# Patient Record
Sex: FEMALE | Race: WHITE | NOT HISPANIC OR LATINO | URBAN - METROPOLITAN AREA
[De-identification: names, ages, dates, MRNs, and addresses within clinical notes are randomized per-mention and may not be internally consistent; named-entity substitution may affect disease eponyms.]

---

## 2017-01-01 ENCOUNTER — APPOINTMENT (INPATIENT)
Dept: NEUROLOGY | Facility: AMBULATORY SURGERY CENTER | Age: 67
DRG: 207 | End: 2017-01-01
Payer: MEDICARE

## 2017-01-01 ENCOUNTER — APPOINTMENT (INPATIENT)
Dept: RADIOLOGY | Facility: HOSPITAL | Age: 67
DRG: 207 | End: 2017-01-01
Payer: MEDICARE

## 2017-01-01 ENCOUNTER — GENERIC CONVERSION - ENCOUNTER (OUTPATIENT)
Dept: OTHER | Facility: OTHER | Age: 67
End: 2017-01-01

## 2017-01-01 ENCOUNTER — APPOINTMENT (INPATIENT)
Dept: NON INVASIVE DIAGNOSTICS | Facility: HOSPITAL | Age: 67
DRG: 207 | End: 2017-01-01
Payer: MEDICARE

## 2017-01-01 ENCOUNTER — LAB REQUISITION (OUTPATIENT)
Dept: LAB | Facility: HOSPITAL | Age: 67
End: 2017-01-01
Payer: MEDICARE

## 2017-01-01 ENCOUNTER — HOSPITAL ENCOUNTER (INPATIENT)
Facility: HOSPITAL | Age: 67
LOS: 5 days | DRG: 207 | End: 2018-01-03
Attending: EMERGENCY MEDICINE | Admitting: EMERGENCY MEDICINE
Payer: MEDICARE

## 2017-01-01 ENCOUNTER — APPOINTMENT (EMERGENCY)
Dept: RADIOLOGY | Facility: HOSPITAL | Age: 67
DRG: 207 | End: 2017-01-01
Payer: MEDICARE

## 2017-01-01 ENCOUNTER — ALLSCRIPTS OFFICE VISIT (OUTPATIENT)
Dept: OTHER | Facility: OTHER | Age: 67
End: 2017-01-01

## 2017-01-01 DIAGNOSIS — R04.2 HEMOPTYSIS: ICD-10-CM

## 2017-01-01 DIAGNOSIS — G93.40 ACUTE ENCEPHALOPATHY: ICD-10-CM

## 2017-01-01 DIAGNOSIS — Z12.31 ENCOUNTER FOR SCREENING MAMMOGRAM FOR MALIGNANT NEOPLASM OF BREAST: ICD-10-CM

## 2017-01-01 DIAGNOSIS — Z01.419 ENCOUNTER FOR GYNECOLOGICAL EXAMINATION WITHOUT ABNORMAL FINDING: ICD-10-CM

## 2017-01-01 DIAGNOSIS — I46.9 CARDIAC ARREST (HCC): Primary | ICD-10-CM

## 2017-01-01 LAB
ABO GROUP BLD: NORMAL
ADDITIONAL SETTING: 0
ALBUMIN SERPL BCP-MCNC: 2.4 G/DL (ref 3.5–5)
ALBUMIN SERPL BCP-MCNC: 2.4 G/DL (ref 3.5–5)
ALBUMIN SERPL BCP-MCNC: 2.6 G/DL (ref 3.5–5)
ALBUMIN SERPL BCP-MCNC: 3 G/DL (ref 3.5–5)
ALP SERPL-CCNC: 71 U/L (ref 46–116)
ALP SERPL-CCNC: 72 U/L (ref 46–116)
ALP SERPL-CCNC: 83 U/L (ref 46–116)
ALP SERPL-CCNC: 99 U/L (ref 46–116)
ALT SERPL W P-5'-P-CCNC: 236 U/L (ref 12–78)
ALT SERPL W P-5'-P-CCNC: 300 U/L (ref 12–78)
ALT SERPL W P-5'-P-CCNC: 325 U/L (ref 12–78)
ALT SERPL W P-5'-P-CCNC: 372 U/L (ref 12–78)
ANCILLARY VALUES: ABNORMAL
ANION GAP BLD CALC-SCNC: 21 MMOL/L (ref 4–13)
ANION GAP SERPL CALCULATED.3IONS-SCNC: 10 MMOL/L (ref 4–13)
ANION GAP SERPL CALCULATED.3IONS-SCNC: 11 MMOL/L (ref 4–13)
ANION GAP SERPL CALCULATED.3IONS-SCNC: 12 MMOL/L (ref 4–13)
ANION GAP SERPL CALCULATED.3IONS-SCNC: 13 MMOL/L (ref 4–13)
ANION GAP SERPL CALCULATED.3IONS-SCNC: 14 MMOL/L (ref 4–13)
ANION GAP SERPL CALCULATED.3IONS-SCNC: 17 MMOL/L (ref 4–13)
ANION GAP SERPL CALCULATED.3IONS-SCNC: 8 MMOL/L (ref 4–13)
ANION GAP SERPL CALCULATED.3IONS-SCNC: 9 MMOL/L (ref 4–13)
APTT PPP: 101 SECONDS (ref 23–35)
APTT PPP: 28 SECONDS (ref 23–35)
APTT PPP: 32 SECONDS (ref 23–35)
APTT PPP: 41 SECONDS (ref 23–35)
APTT PPP: 49 SECONDS (ref 23–35)
APTT PPP: 63 SECONDS (ref 23–35)
APTT PPP: 93 SECONDS (ref 23–35)
APTT PPP: 97 SECONDS (ref 23–35)
ARTERIAL PATENCY WRIST A: ABNORMAL
ARTERIAL PATENCY WRIST A: NO
ARTERIAL PATENCY WRIST A: NO
ARTERIAL PATENCY WRIST A: YES
AST SERPL W P-5'-P-CCNC: 329 U/L (ref 5–45)
AST SERPL W P-5'-P-CCNC: 357 U/L (ref 5–45)
AST SERPL W P-5'-P-CCNC: 523 U/L (ref 5–45)
AST SERPL W P-5'-P-CCNC: 791 U/L (ref 5–45)
ATRIAL RATE: 64 BPM
ATRIAL RATE: 72 BPM
ATRIAL RATE: 73 BPM
ATRIAL RATE: 99 BPM
BASE EXCESS BLDA CALC-SCNC: -13 MMOL/L (ref -2–3)
BASE EXCESS BLDA CALC-SCNC: -13 MMOL/L (ref -2–3)
BASE EXCESS BLDA CALC-SCNC: -2.4 MMOL/L
BASE EXCESS BLDA CALC-SCNC: -2.9 MMOL/L
BASE EXCESS BLDA CALC-SCNC: -3.8 MMOL/L
BASE EXCESS BLDA CALC-SCNC: -5 MMOL/L
BASE EXCESS BLDA CALC-SCNC: -9.5 MMOL/L
BASE EXCESS BLDA CALC-SCNC: 0.6 MMOL/L
BASE EXCESS BLDA CALC-SCNC: 0.7 MMOL/L
BASE EXCESS BLDA CALC-SCNC: 0.9 MMOL/L
BASE EXCESS BLDA CALC-SCNC: 2 MMOL/L
BASE EXCESS BLDA CALC-SCNC: 2.8 MMOL/L
BASOPHILS # BLD AUTO: 0.01 THOUSANDS/ΜL (ref 0–0.1)
BASOPHILS # BLD AUTO: 0.01 THOUSANDS/ΜL (ref 0–0.1)
BASOPHILS # BLD AUTO: 0.03 THOUSANDS/ΜL (ref 0–0.1)
BASOPHILS # BLD MANUAL: 0 THOUSAND/UL (ref 0–0.1)
BASOPHILS # BLD MANUAL: 0.11 THOUSAND/UL (ref 0–0.1)
BASOPHILS NFR BLD AUTO: 0 % (ref 0–1)
BASOPHILS NFR MAR MANUAL: 0 % (ref 0–1)
BASOPHILS NFR MAR MANUAL: 2 % (ref 0–1)
BILIRUB SERPL-MCNC: 0.29 MG/DL (ref 0.2–1)
BILIRUB SERPL-MCNC: 0.41 MG/DL (ref 0.2–1)
BILIRUB SERPL-MCNC: 0.61 MG/DL (ref 0.2–1)
BILIRUB SERPL-MCNC: 0.66 MG/DL (ref 0.2–1)
BLD GP AB SCN SERPL QL: NEGATIVE
BODY TEMPERATURE: 36.6 DEGREES FEHRENHEIT
BODY TEMPERATURE: 37 DEGREES FEHRENHEIT
BODY TEMPERATURE: 37.1 DEGREES FEHRENHEIT
BODY TEMPERATURE: 96.1 DEGREES FEHRENHEIT
BODY TEMPERATURE: 96.4 DEGREES FEHRENHEIT
BODY TEMPERATURE: 96.8 DEGREES FEHRENHEIT
BODY TEMPERATURE: 97 DEGREES FEHRENHEIT
BODY TEMPERATURE: 98.3 DEGREES FEHRENHEIT
BODY TEMPERATURE: 98.8 DEGREES FEHRENHEIT
BUN BLD-MCNC: 26 MG/DL (ref 5–25)
BUN SERPL-MCNC: 21 MG/DL (ref 5–25)
BUN SERPL-MCNC: 23 MG/DL (ref 5–25)
BUN SERPL-MCNC: 26 MG/DL (ref 5–25)
BUN SERPL-MCNC: 27 MG/DL (ref 5–25)
BUN SERPL-MCNC: 28 MG/DL (ref 5–25)
BUN SERPL-MCNC: 29 MG/DL (ref 5–25)
BUN SERPL-MCNC: 29 MG/DL (ref 5–25)
BUN SERPL-MCNC: 31 MG/DL (ref 5–25)
BURR CELLS BLD QL SMEAR: PRESENT
CA-I BLD-SCNC: 1.1 MMOL/L (ref 1.12–1.32)
CA-I BLD-SCNC: 1.15 MMOL/L (ref 1.12–1.32)
CA-I BLD-SCNC: 1.16 MMOL/L (ref 1.12–1.32)
CA-I BLD-SCNC: 1.17 MMOL/L (ref 1.12–1.32)
CA-I BLD-SCNC: 1.24 MMOL/L (ref 1.12–1.32)
CA-I BLD-SCNC: 1.48 MMOL/L (ref 1.12–1.32)
CALCIUM SERPL-MCNC: 8 MG/DL (ref 8.3–10.1)
CALCIUM SERPL-MCNC: 8.1 MG/DL (ref 8.3–10.1)
CALCIUM SERPL-MCNC: 8.2 MG/DL (ref 8.3–10.1)
CALCIUM SERPL-MCNC: 8.3 MG/DL (ref 8.3–10.1)
CALCIUM SERPL-MCNC: 8.7 MG/DL (ref 8.3–10.1)
CALCIUM SERPL-MCNC: 8.7 MG/DL (ref 8.3–10.1)
CALCIUM SERPL-MCNC: 8.9 MG/DL (ref 8.3–10.1)
CALCIUM SERPL-MCNC: 9.7 MG/DL (ref 8.3–10.1)
CHLORIDE BLD-SCNC: 103 MMOL/L (ref 100–108)
CHLORIDE SERPL-SCNC: 103 MMOL/L (ref 100–108)
CHLORIDE SERPL-SCNC: 106 MMOL/L (ref 100–108)
CHLORIDE SERPL-SCNC: 108 MMOL/L (ref 100–108)
CHLORIDE SERPL-SCNC: 110 MMOL/L (ref 100–108)
CHLORIDE SERPL-SCNC: 110 MMOL/L (ref 100–108)
CHLORIDE SERPL-SCNC: 111 MMOL/L (ref 100–108)
CO2 SERPL-SCNC: 20 MMOL/L (ref 21–32)
CO2 SERPL-SCNC: 21 MMOL/L (ref 21–32)
CO2 SERPL-SCNC: 22 MMOL/L (ref 21–32)
CO2 SERPL-SCNC: 23 MMOL/L (ref 21–32)
CO2 SERPL-SCNC: 24 MMOL/L (ref 21–32)
CO2 SERPL-SCNC: 25 MMOL/L (ref 21–32)
CO2 SERPL-SCNC: 25 MMOL/L (ref 21–32)
CO2 SERPL-SCNC: 27 MMOL/L (ref 21–32)
CREAT BLD-MCNC: 1 MG/DL (ref 0.6–1.3)
CREAT SERPL-MCNC: 1.14 MG/DL (ref 0.6–1.3)
CREAT SERPL-MCNC: 1.15 MG/DL (ref 0.6–1.3)
CREAT SERPL-MCNC: 1.15 MG/DL (ref 0.6–1.3)
CREAT SERPL-MCNC: 1.2 MG/DL (ref 0.6–1.3)
CREAT SERPL-MCNC: 1.2 MG/DL (ref 0.6–1.3)
CREAT SERPL-MCNC: 1.23 MG/DL (ref 0.6–1.3)
CREAT SERPL-MCNC: 1.27 MG/DL (ref 0.6–1.3)
CREAT SERPL-MCNC: 1.28 MG/DL (ref 0.6–1.3)
DS:DELIVERY SYSTEM: AC
EOSINOPHIL # BLD AUTO: 0.01 THOUSAND/ΜL (ref 0–0.61)
EOSINOPHIL # BLD AUTO: 0.02 THOUSAND/ΜL (ref 0–0.61)
EOSINOPHIL # BLD AUTO: 0.06 THOUSAND/ΜL (ref 0–0.61)
EOSINOPHIL # BLD MANUAL: 0 THOUSAND/UL (ref 0–0.4)
EOSINOPHIL # BLD MANUAL: 0.16 THOUSAND/UL (ref 0–0.4)
EOSINOPHIL NFR BLD AUTO: 0 % (ref 0–6)
EOSINOPHIL NFR BLD MANUAL: 0 % (ref 0–6)
EOSINOPHIL NFR BLD MANUAL: 3 % (ref 0–6)
ERYTHROCYTE [DISTWIDTH] IN BLOOD BY AUTOMATED COUNT: 13.6 % (ref 11.6–15.1)
ERYTHROCYTE [DISTWIDTH] IN BLOOD BY AUTOMATED COUNT: 13.8 % (ref 11.6–15.1)
ERYTHROCYTE [DISTWIDTH] IN BLOOD BY AUTOMATED COUNT: 13.8 % (ref 11.6–15.1)
ERYTHROCYTE [DISTWIDTH] IN BLOOD BY AUTOMATED COUNT: 13.9 % (ref 11.6–15.1)
ERYTHROCYTE [DISTWIDTH] IN BLOOD BY AUTOMATED COUNT: 13.9 % (ref 11.6–15.1)
ERYTHROCYTE [DISTWIDTH] IN BLOOD BY AUTOMATED COUNT: 14.1 % (ref 11.6–15.1)
FIO2 GAS DIL.REBREATH: 100 L
FLUAV AG SPEC QL: NORMAL
FLUBV AG SPEC QL: NORMAL
GFR SERPL CREATININE-BSD FRML MDRD: 44 ML/MIN/1.73SQ M
GFR SERPL CREATININE-BSD FRML MDRD: 44 ML/MIN/1.73SQ M
GFR SERPL CREATININE-BSD FRML MDRD: 46 ML/MIN/1.73SQ M
GFR SERPL CREATININE-BSD FRML MDRD: 47 ML/MIN/1.73SQ M
GFR SERPL CREATININE-BSD FRML MDRD: 47 ML/MIN/1.73SQ M
GFR SERPL CREATININE-BSD FRML MDRD: 50 ML/MIN/1.73SQ M
GFR SERPL CREATININE-BSD FRML MDRD: 59 ML/MIN/1.73SQ M
GIANT PLATELETS BLD QL SMEAR: PRESENT
GLUCOSE SERPL-MCNC: 105 MG/DL (ref 65–140)
GLUCOSE SERPL-MCNC: 108 MG/DL (ref 65–140)
GLUCOSE SERPL-MCNC: 114 MG/DL (ref 65–140)
GLUCOSE SERPL-MCNC: 126 MG/DL (ref 65–140)
GLUCOSE SERPL-MCNC: 148 MG/DL (ref 65–140)
GLUCOSE SERPL-MCNC: 155 MG/DL (ref 65–140)
GLUCOSE SERPL-MCNC: 159 MG/DL (ref 65–140)
GLUCOSE SERPL-MCNC: 160 MG/DL (ref 65–140)
GLUCOSE SERPL-MCNC: 168 MG/DL (ref 65–140)
GLUCOSE SERPL-MCNC: 173 MG/DL (ref 65–140)
GLUCOSE SERPL-MCNC: 174 MG/DL (ref 65–140)
GLUCOSE SERPL-MCNC: 183 MG/DL (ref 65–140)
GLUCOSE SERPL-MCNC: 196 MG/DL (ref 65–140)
GLUCOSE SERPL-MCNC: 199 MG/DL (ref 65–140)
GLUCOSE SERPL-MCNC: 234 MG/DL (ref 65–140)
GLUCOSE SERPL-MCNC: 282 MG/DL (ref 65–140)
GLUCOSE SERPL-MCNC: 299 MG/DL (ref 65–140)
GLUCOSE SERPL-MCNC: 301 MG/DL (ref 65–140)
GLUCOSE SERPL-MCNC: 312 MG/DL (ref 65–140)
HCO3 BLDA-SCNC: 16.3 MMOL/L (ref 22–28)
HCO3 BLDA-SCNC: 18.2 MMOL/L (ref 22–28)
HCO3 BLDA-SCNC: 18.3 MMOL/L (ref 22–28)
HCO3 BLDA-SCNC: 19.1 MMOL/L (ref 24–30)
HCO3 BLDA-SCNC: 19.8 MMOL/L (ref 22–28)
HCO3 BLDA-SCNC: 21.5 MMOL/L (ref 22–28)
HCO3 BLDA-SCNC: 21.6 MMOL/L (ref 22–28)
HCO3 BLDA-SCNC: 23.1 MMOL/L (ref 22–28)
HCO3 BLDA-SCNC: 23.6 MMOL/L (ref 22–28)
HCO3 BLDA-SCNC: 23.9 MMOL/L (ref 22–28)
HCO3 BLDA-SCNC: 24.9 MMOL/L (ref 22–28)
HCO3 BLDA-SCNC: 25.8 MMOL/L (ref 22–28)
HCT VFR BLD AUTO: 32.6 % (ref 34.8–46.1)
HCT VFR BLD AUTO: 35.6 % (ref 34.8–46.1)
HCT VFR BLD AUTO: 35.9 % (ref 34.8–46.1)
HCT VFR BLD AUTO: 37.2 % (ref 34.8–46.1)
HCT VFR BLD AUTO: 39.1 % (ref 34.8–46.1)
HCT VFR BLD AUTO: 41.1 % (ref 34.8–46.1)
HCT VFR BLD CALC: 30 % (ref 34.8–46.1)
HCT VFR BLD CALC: 39 % (ref 34.8–46.1)
HCT VFR BLD CALC: 40 % (ref 34.8–46.1)
HGB BLD-MCNC: 11.1 G/DL (ref 11.5–15.4)
HGB BLD-MCNC: 12.1 G/DL (ref 11.5–15.4)
HGB BLD-MCNC: 12.4 G/DL (ref 11.5–15.4)
HGB BLD-MCNC: 12.7 G/DL (ref 11.5–15.4)
HGB BLD-MCNC: 13 G/DL (ref 11.5–15.4)
HGB BLD-MCNC: 13.2 G/DL (ref 11.5–15.4)
HGB BLDA-MCNC: 10.2 G/DL (ref 11.5–15.4)
HGB BLDA-MCNC: 13.3 G/DL (ref 11.5–15.4)
HGB BLDA-MCNC: 13.6 G/DL (ref 11.5–15.4)
HOROWITZ INDEX BLDA+IHG-RTO: 100 MM[HG]
HOROWITZ INDEX BLDA+IHG-RTO: 100 MM[HG]
HOROWITZ INDEX BLDA+IHG-RTO: 50 MM[HG]
HOROWITZ INDEX BLDA+IHG-RTO: 60 MM[HG]
HOROWITZ INDEX BLDA+IHG-RTO: 65 MM[HG]
INR PPP: 1.3 (ref 0.86–1.16)
INR PPP: 1.33 (ref 0.86–1.16)
LAB AP GYN PRIMARY INTERPRETATION: NORMAL
LACTATE SERPL-SCNC: 1.7 MMOL/L (ref 0.5–2)
LACTATE SERPL-SCNC: 2 MMOL/L (ref 0.5–2)
LACTATE SERPL-SCNC: 2.3 MMOL/L (ref 0.5–2)
LACTATE SERPL-SCNC: 2.7 MMOL/L (ref 0.5–2)
LACTATE SERPL-SCNC: 3.7 MMOL/L (ref 0.5–2)
LACTATE SERPL-SCNC: 5.4 MMOL/L (ref 0.5–2)
LACTATE SERPL-SCNC: 5.6 MMOL/L (ref 0.5–2)
LACTATE SERPL-SCNC: 6.2 MMOL/L (ref 0.5–2)
LACTATE SERPL-SCNC: 6.4 MMOL/L (ref 0.5–2)
LACTATE SERPL-SCNC: 8.4 MMOL/L (ref 0.5–2)
LYMPHOCYTES # BLD AUTO: 0.59 THOUSANDS/ΜL (ref 0.6–4.47)
LYMPHOCYTES # BLD AUTO: 0.79 THOUSAND/UL (ref 0.6–4.47)
LYMPHOCYTES # BLD AUTO: 0.9 THOUSANDS/ΜL (ref 0.6–4.47)
LYMPHOCYTES # BLD AUTO: 2.63 THOUSANDS/ΜL (ref 0.6–4.47)
LYMPHOCYTES # BLD AUTO: 3.96 THOUSAND/UL (ref 0.6–4.47)
LYMPHOCYTES # BLD AUTO: 4 % (ref 14–44)
LYMPHOCYTES # BLD AUTO: 73 % (ref 14–44)
LYMPHOCYTES NFR BLD AUTO: 13 % (ref 14–44)
LYMPHOCYTES NFR BLD AUTO: 19 % (ref 14–44)
LYMPHOCYTES NFR BLD AUTO: 7 % (ref 14–44)
Lab: NORMAL
MAGNESIUM SERPL-MCNC: 2.2 MG/DL (ref 1.6–2.6)
MAGNESIUM SERPL-MCNC: 2.3 MG/DL (ref 1.6–2.6)
MAGNESIUM SERPL-MCNC: 2.4 MG/DL (ref 1.6–2.6)
MAGNESIUM SERPL-MCNC: 2.7 MG/DL (ref 1.6–2.6)
MCH RBC QN AUTO: 29.5 PG (ref 26.8–34.3)
MCH RBC QN AUTO: 29.6 PG (ref 26.8–34.3)
MCH RBC QN AUTO: 29.7 PG (ref 26.8–34.3)
MCH RBC QN AUTO: 30.1 PG (ref 26.8–34.3)
MCH RBC QN AUTO: 30.2 PG (ref 26.8–34.3)
MCH RBC QN AUTO: 30.5 PG (ref 26.8–34.3)
MCHC RBC AUTO-ENTMCNC: 32.1 G/DL (ref 31.4–37.4)
MCHC RBC AUTO-ENTMCNC: 33.2 G/DL (ref 31.4–37.4)
MCHC RBC AUTO-ENTMCNC: 33.7 G/DL (ref 31.4–37.4)
MCHC RBC AUTO-ENTMCNC: 34 G/DL (ref 31.4–37.4)
MCHC RBC AUTO-ENTMCNC: 34.1 G/DL (ref 31.4–37.4)
MCHC RBC AUTO-ENTMCNC: 34.8 G/DL (ref 31.4–37.4)
MCV RBC AUTO: 87 FL (ref 82–98)
MCV RBC AUTO: 88 FL (ref 82–98)
MCV RBC AUTO: 91 FL (ref 82–98)
MCV RBC AUTO: 92 FL (ref 82–98)
METAMYELOCYTES NFR BLD MANUAL: 3 % (ref 0–1)
MONOCYTES # BLD AUTO: 0.11 THOUSAND/UL (ref 0–1.22)
MONOCYTES # BLD AUTO: 0.18 THOUSAND/ΜL (ref 0.17–1.22)
MONOCYTES # BLD AUTO: 0.22 THOUSAND/ΜL (ref 0.17–1.22)
MONOCYTES # BLD AUTO: 0.4 THOUSAND/UL (ref 0–1.22)
MONOCYTES # BLD AUTO: 0.43 THOUSAND/ΜL (ref 0.17–1.22)
MONOCYTES NFR BLD AUTO: 1 % (ref 4–12)
MONOCYTES NFR BLD AUTO: 3 % (ref 4–12)
MONOCYTES NFR BLD AUTO: 5 % (ref 4–12)
MONOCYTES NFR BLD: 2 % (ref 4–12)
MONOCYTES NFR BLD: 2 % (ref 4–12)
NEUTROPHILS # BLD AUTO: 10.52 THOUSANDS/ΜL (ref 1.85–7.62)
NEUTROPHILS # BLD AUTO: 5.67 THOUSANDS/ΜL (ref 1.85–7.62)
NEUTROPHILS # BLD AUTO: 7.29 THOUSANDS/ΜL (ref 1.85–7.62)
NEUTROPHILS # BLD MANUAL: 0.76 THOUSAND/UL (ref 1.85–7.62)
NEUTROPHILS # BLD MANUAL: 18.63 THOUSAND/UL (ref 1.85–7.62)
NEUTS BAND NFR BLD MANUAL: 2 % (ref 0–8)
NEUTS BAND NFR BLD MANUAL: 3 % (ref 0–8)
NEUTS SEG NFR BLD AUTO: 11 % (ref 43–75)
NEUTS SEG NFR BLD AUTO: 80 % (ref 43–75)
NEUTS SEG NFR BLD AUTO: 84 % (ref 43–75)
NEUTS SEG NFR BLD AUTO: 88 % (ref 43–75)
NEUTS SEG NFR BLD AUTO: 92 % (ref 43–75)
NRBC BLD AUTO-RTO: 0 /100 WBCS
NRBC BLD AUTO-RTO: 1 /100 WBCS
NRBC BLD AUTO-RTO: 4 /100 WBC (ref 0–2)
O2 CT BLDA-SCNC: 14 ML/DL (ref 16–23)
O2 CT BLDA-SCNC: 14.1 ML/DL (ref 16–23)
O2 CT BLDA-SCNC: 16 ML/DL (ref 16–23)
O2 CT BLDA-SCNC: 16.1 ML/DL (ref 16–23)
O2 CT BLDA-SCNC: 16.1 ML/DL (ref 16–23)
O2 CT BLDA-SCNC: 16.2 ML/DL (ref 16–23)
O2 CT BLDA-SCNC: 17.1 ML/DL (ref 16–23)
O2 CT BLDA-SCNC: 17.3 ML/DL (ref 16–23)
O2 CT BLDA-SCNC: 17.3 ML/DL (ref 16–23)
O2 CT BLDA-SCNC: 18.5 ML/DL (ref 16–23)
OXYHGB MFR BLDA: 90.3 % (ref 94–97)
OXYHGB MFR BLDA: 96.5 % (ref 94–97)
OXYHGB MFR BLDA: 96.7 % (ref 94–97)
OXYHGB MFR BLDA: 96.8 % (ref 94–97)
OXYHGB MFR BLDA: 97.2 % (ref 94–97)
OXYHGB MFR BLDA: 97.6 % (ref 94–97)
OXYHGB MFR BLDA: 98 % (ref 94–97)
OXYHGB MFR BLDA: 98.4 % (ref 94–97)
OXYHGB MFR BLDA: 98.5 % (ref 94–97)
OXYHGB MFR BLDA: 98.6 % (ref 94–97)
P AXIS: 20 DEGREES
P AXIS: 24 DEGREES
P AXIS: 32 DEGREES
P AXIS: 33 DEGREES
PCO2 BLD: 18 MMOL/L (ref 21–32)
PCO2 BLD: 21 MMOL/L (ref 21–32)
PCO2 BLD: 21 MMOL/L (ref 21–32)
PCO2 BLD: 52.2 MM HG (ref 36–44)
PCO2 BLD: 73.7 MM HG (ref 42–50)
PCO2 BLDA: 28.3 MM HG (ref 36–44)
PCO2 BLDA: 29 MM HG (ref 36–44)
PCO2 BLDA: 30 MM HG (ref 36–44)
PCO2 BLDA: 32.3 MM HG (ref 36–44)
PCO2 BLDA: 32.8 MM HG (ref 36–44)
PCO2 BLDA: 32.8 MM HG (ref 36–44)
PCO2 BLDA: 33.9 MM HG (ref 36–44)
PCO2 BLDA: 34.9 MM HG (ref 36–44)
PCO2 BLDA: 40.1 MM HG (ref 36–44)
PCO2 BLDA: 46.3 MM HG (ref 36–44)
PCO2 TEMP ADJ BLDA: 27.1 MM HG (ref 36–44)
PCO2 TEMP ADJ BLDA: 27.5 MM HG (ref 36–44)
PEEP RESPIRATORY: 10 CM[H2O]
PEEP RESPIRATORY: 10 CM[H2O]
PEEP RESPIRATORY: 12 CM[H2O]
PH BLD: 7.02 [PH] (ref 7.3–7.4)
PH BLD: 7.1 [PH] (ref 7.35–7.45)
PH BLD: 7.43 [PH] (ref 7.35–7.45)
PH BLD: 7.48 [PH] (ref 7.35–7.45)
PH BLDA: 7.21 [PH] (ref 7.35–7.45)
PH BLDA: 7.35 [PH] (ref 7.35–7.45)
PH BLDA: 7.41 [PH] (ref 7.35–7.45)
PH BLDA: 7.42 [PH] (ref 7.35–7.45)
PH BLDA: 7.46 [PH] (ref 7.35–7.45)
PH BLDA: 7.48 [PH] (ref 7.35–7.45)
PH BLDA: 7.48 [PH] (ref 7.35–7.45)
PH BLDA: 7.5 [PH] (ref 7.35–7.45)
PHOSPHATE SERPL-MCNC: 2.6 MG/DL (ref 2.3–4.1)
PHOSPHATE SERPL-MCNC: 3 MG/DL (ref 2.3–4.1)
PHOSPHATE SERPL-MCNC: 3.5 MG/DL (ref 2.3–4.1)
PHOSPHATE SERPL-MCNC: 4.1 MG/DL (ref 2.3–4.1)
PLATELET # BLD AUTO: 145 THOUSANDS/UL (ref 149–390)
PLATELET # BLD AUTO: 145 THOUSANDS/UL (ref 149–390)
PLATELET # BLD AUTO: 147 THOUSANDS/UL (ref 149–390)
PLATELET # BLD AUTO: 149 THOUSANDS/UL (ref 149–390)
PLATELET # BLD AUTO: 166 THOUSANDS/UL (ref 149–390)
PLATELET # BLD AUTO: 177 THOUSANDS/UL (ref 149–390)
PLATELET BLD QL SMEAR: ADEQUATE
PLATELET BLD QL SMEAR: ADEQUATE
PMV BLD AUTO: 8.6 FL (ref 8.9–12.7)
PMV BLD AUTO: 8.9 FL (ref 8.9–12.7)
PMV BLD AUTO: 9 FL (ref 8.9–12.7)
PMV BLD AUTO: 9.3 FL (ref 8.9–12.7)
PO2 BLD: 113.2 MM HG (ref 75–129)
PO2 BLD: 22 MM HG (ref 35–45)
PO2 BLD: 56 MM HG (ref 75–129)
PO2 BLD: 97 MM HG (ref 75–129)
PO2 BLDA: 104 MM HG (ref 75–129)
PO2 BLDA: 119.3 MM HG (ref 75–129)
PO2 BLDA: 126.8 MM HG (ref 75–129)
PO2 BLDA: 158.1 MM HG (ref 75–129)
PO2 BLDA: 166.3 MM HG (ref 75–129)
PO2 BLDA: 184 MM HG (ref 75–129)
PO2 BLDA: 70.3 MM HG (ref 75–129)
PO2 BLDA: 87 MM HG (ref 75–129)
PO2 BLDA: 90.4 MM HG (ref 75–129)
PO2 BLDA: 94.5 MM HG (ref 75–129)
POIKILOCYTOSIS BLD QL SMEAR: PRESENT
POIKILOCYTOSIS BLD QL SMEAR: PRESENT
POTASSIUM BLD-SCNC: 2.9 MMOL/L (ref 3.5–5.3)
POTASSIUM BLD-SCNC: 2.9 MMOL/L (ref 3.5–5.3)
POTASSIUM BLD-SCNC: 3 MMOL/L (ref 3.5–5.3)
POTASSIUM SERPL-SCNC: 3.1 MMOL/L (ref 3.5–5.3)
POTASSIUM SERPL-SCNC: 3.6 MMOL/L (ref 3.5–5.3)
POTASSIUM SERPL-SCNC: 4 MMOL/L (ref 3.5–5.3)
POTASSIUM SERPL-SCNC: 4.2 MMOL/L (ref 3.5–5.3)
POTASSIUM SERPL-SCNC: 4.5 MMOL/L (ref 3.5–5.3)
PR INTERVAL: 158 MS
PR INTERVAL: 171 MS
PR INTERVAL: 175 MS
PR INTERVAL: 179 MS
PRESSURE SETTING: 5
PROT SERPL-MCNC: 5 G/DL (ref 6.4–8.2)
PROT SERPL-MCNC: 5.6 G/DL (ref 6.4–8.2)
PROT SERPL-MCNC: 5.6 G/DL (ref 6.4–8.2)
PROT SERPL-MCNC: 6.3 G/DL (ref 6.4–8.2)
PROTHROMBIN TIME: 16.3 SECONDS (ref 12.1–14.4)
PROTHROMBIN TIME: 16.6 SECONDS (ref 12.1–14.4)
QRS AXIS: 42 DEGREES
QRS AXIS: 77 DEGREES
QRS AXIS: 78 DEGREES
QRS AXIS: 93 DEGREES
QRSD INTERVAL: 67 MS
QRSD INTERVAL: 71 MS
QRSD INTERVAL: 71 MS
QRSD INTERVAL: 79 MS
QT INTERVAL: 392 MS
QT INTERVAL: 463 MS
QT INTERVAL: 471 MS
QT INTERVAL: 496 MS
QTC INTERVAL: 504 MS
QTC INTERVAL: 511 MS
QTC INTERVAL: 512 MS
QTC INTERVAL: 516 MS
RBC # BLD AUTO: 3.75 MILLION/UL (ref 3.81–5.12)
RBC # BLD AUTO: 4.07 MILLION/UL (ref 3.81–5.12)
RBC # BLD AUTO: 4.1 MILLION/UL (ref 3.81–5.12)
RBC # BLD AUTO: 4.22 MILLION/UL (ref 3.81–5.12)
RBC # BLD AUTO: 4.3 MILLION/UL (ref 3.81–5.12)
RBC # BLD AUTO: 4.45 MILLION/UL (ref 3.81–5.12)
RBC MORPH BLD: PRESENT
RBC MORPH BLD: PRESENT
RESPIRATORY RATE: 180
RH BLD: POSITIVE
RSV B RNA SPEC QL NAA+PROBE: NORMAL
SAMPLE SITE: ABNORMAL
SAO2 % BLD FROM PO2: 19 % (ref 95–98)
SAO2 % BLD FROM PO2: 76 % (ref 95–98)
SODIUM BLD-SCNC: 140 MMOL/L (ref 136–145)
SODIUM BLD-SCNC: 140 MMOL/L (ref 136–145)
SODIUM BLD-SCNC: 141 MMOL/L (ref 136–145)
SODIUM SERPL-SCNC: 140 MMOL/L (ref 136–145)
SODIUM SERPL-SCNC: 141 MMOL/L (ref 136–145)
SODIUM SERPL-SCNC: 144 MMOL/L (ref 136–145)
SODIUM SERPL-SCNC: 145 MMOL/L (ref 136–145)
SODIUM SERPL-SCNC: 145 MMOL/L (ref 136–145)
SODIUM SERPL-SCNC: 148 MMOL/L (ref 136–145)
SPECIMEN EXPIRATION DATE: NORMAL
SPECIMEN SOURCE: ABNORMAL
T WAVE AXIS: -9 DEGREES
T WAVE AXIS: 13 DEGREES
T WAVE AXIS: 15 DEGREES
T WAVE AXIS: 7 DEGREES
TROPONIN I BLD-MCNC: 0.22 NG/ML (ref 0–0.08)
TROPONIN I SERPL-MCNC: 26.8 NG/ML
TROPONIN I SERPL-MCNC: 27.7 NG/ML
TROPONIN I SERPL-MCNC: 34 NG/ML
TROPONIN I SERPL-MCNC: 9.15 NG/ML
VARIANT LYMPHS # BLD AUTO: 3 %
VENT AC: 12
VENT AC: 18
VENT AC: 22
VENT TYPE: 1
VENT- AC: AC
VENTILATION VALUE: 450
VENTRICULAR RATE: 64 BPM
VENTRICULAR RATE: 72 BPM
VENTRICULAR RATE: 73 BPM
VENTRICULAR RATE: 99 BPM
VT SETTING VENT: 400 ML
VT SETTING VENT: 450 ML
WBC # BLD AUTO: 13.62 THOUSAND/UL (ref 4.31–10.16)
WBC # BLD AUTO: 19.82 THOUSAND/UL (ref 4.31–10.16)
WBC # BLD AUTO: 5.42 THOUSAND/UL (ref 4.31–10.16)
WBC # BLD AUTO: 6.86 THOUSAND/UL (ref 4.31–10.16)
WBC # BLD AUTO: 7.62 THOUSAND/UL (ref 4.31–10.16)
WBC # BLD AUTO: 8.36 THOUSAND/UL (ref 4.31–10.16)

## 2017-01-01 PROCEDURE — 85014 HEMATOCRIT: CPT

## 2017-01-01 PROCEDURE — 94760 N-INVAS EAR/PLS OXIMETRY 1: CPT

## 2017-01-01 PROCEDURE — 84484 ASSAY OF TROPONIN QUANT: CPT | Performed by: NURSE PRACTITIONER

## 2017-01-01 PROCEDURE — 82330 ASSAY OF CALCIUM: CPT

## 2017-01-01 PROCEDURE — 82805 BLOOD GASES W/O2 SATURATION: CPT | Performed by: NURSE PRACTITIONER

## 2017-01-01 PROCEDURE — 96375 TX/PRO/DX INJ NEW DRUG ADDON: CPT

## 2017-01-01 PROCEDURE — 85007 BL SMEAR W/DIFF WBC COUNT: CPT | Performed by: NURSE PRACTITIONER

## 2017-01-01 PROCEDURE — 36415 COLL VENOUS BLD VENIPUNCTURE: CPT | Performed by: EMERGENCY MEDICINE

## 2017-01-01 PROCEDURE — 94003 VENT MGMT INPAT SUBQ DAY: CPT

## 2017-01-01 PROCEDURE — 83605 ASSAY OF LACTIC ACID: CPT | Performed by: NURSE PRACTITIONER

## 2017-01-01 PROCEDURE — 86900 BLOOD TYPING SEROLOGIC ABO: CPT | Performed by: PHYSICIAN ASSISTANT

## 2017-01-01 PROCEDURE — 80053 COMPREHEN METABOLIC PANEL: CPT | Performed by: NURSE PRACTITIONER

## 2017-01-01 PROCEDURE — 82330 ASSAY OF CALCIUM: CPT | Performed by: NURSE PRACTITIONER

## 2017-01-01 PROCEDURE — 96376 TX/PRO/DX INJ SAME DRUG ADON: CPT

## 2017-01-01 PROCEDURE — 87798 DETECT AGENT NOS DNA AMP: CPT | Performed by: NURSE PRACTITIONER

## 2017-01-01 PROCEDURE — 84100 ASSAY OF PHOSPHORUS: CPT | Performed by: NURSE PRACTITIONER

## 2017-01-01 PROCEDURE — 85730 THROMBOPLASTIN TIME PARTIAL: CPT | Performed by: PHYSICIAN ASSISTANT

## 2017-01-01 PROCEDURE — 80047 BASIC METABLC PNL IONIZED CA: CPT

## 2017-01-01 PROCEDURE — 95951 HB EEG MONITORING/VIDEORECORD: CPT

## 2017-01-01 PROCEDURE — 70450 CT HEAD/BRAIN W/O DYE: CPT

## 2017-01-01 PROCEDURE — G0145 SCR C/V CYTO,THINLAYER,RESCR: HCPCS | Performed by: OBSTETRICS & GYNECOLOGY

## 2017-01-01 PROCEDURE — 80048 BASIC METABOLIC PNL TOTAL CA: CPT | Performed by: NURSE PRACTITIONER

## 2017-01-01 PROCEDURE — C9113 INJ PANTOPRAZOLE SODIUM, VIA: HCPCS | Performed by: PHYSICIAN ASSISTANT

## 2017-01-01 PROCEDURE — 02HV33Z INSERTION OF INFUSION DEVICE INTO SUPERIOR VENA CAVA, PERCUTANEOUS APPROACH: ICD-10-PCS | Performed by: EMERGENCY MEDICINE

## 2017-01-01 PROCEDURE — 93005 ELECTROCARDIOGRAM TRACING: CPT

## 2017-01-01 PROCEDURE — 85730 THROMBOPLASTIN TIME PARTIAL: CPT | Performed by: NURSE PRACTITIONER

## 2017-01-01 PROCEDURE — 71010 HB  X-RAY EXAM CHEST 1 VIEW (PORTABLE): CPT

## 2017-01-01 PROCEDURE — 84100 ASSAY OF PHOSPHORUS: CPT | Performed by: EMERGENCY MEDICINE

## 2017-01-01 PROCEDURE — 85027 COMPLETE CBC AUTOMATED: CPT | Performed by: NURSE PRACTITIONER

## 2017-01-01 PROCEDURE — 96368 THER/DIAG CONCURRENT INF: CPT

## 2017-01-01 PROCEDURE — 85610 PROTHROMBIN TIME: CPT | Performed by: EMERGENCY MEDICINE

## 2017-01-01 PROCEDURE — 84484 ASSAY OF TROPONIN QUANT: CPT

## 2017-01-01 PROCEDURE — 85025 COMPLETE CBC W/AUTO DIFF WBC: CPT | Performed by: NURSE PRACTITIONER

## 2017-01-01 PROCEDURE — 83735 ASSAY OF MAGNESIUM: CPT | Performed by: EMERGENCY MEDICINE

## 2017-01-01 PROCEDURE — 82948 REAGENT STRIP/BLOOD GLUCOSE: CPT

## 2017-01-01 PROCEDURE — 85027 COMPLETE CBC AUTOMATED: CPT | Performed by: EMERGENCY MEDICINE

## 2017-01-01 PROCEDURE — 85730 THROMBOPLASTIN TIME PARTIAL: CPT | Performed by: EMERGENCY MEDICINE

## 2017-01-01 PROCEDURE — 99291 CRITICAL CARE FIRST HOUR: CPT

## 2017-01-01 PROCEDURE — 83735 ASSAY OF MAGNESIUM: CPT | Performed by: NURSE PRACTITIONER

## 2017-01-01 PROCEDURE — 84132 ASSAY OF SERUM POTASSIUM: CPT | Performed by: NURSE PRACTITIONER

## 2017-01-01 PROCEDURE — 0BJ08ZZ INSPECTION OF TRACHEOBRONCHIAL TREE, VIA NATURAL OR ARTIFICIAL OPENING ENDOSCOPIC: ICD-10-PCS | Performed by: EMERGENCY MEDICINE

## 2017-01-01 PROCEDURE — 85007 BL SMEAR W/DIFF WBC COUNT: CPT | Performed by: EMERGENCY MEDICINE

## 2017-01-01 PROCEDURE — 36620 INSERTION CATHETER ARTERY: CPT

## 2017-01-01 PROCEDURE — 93005 ELECTROCARDIOGRAM TRACING: CPT | Performed by: EMERGENCY MEDICINE

## 2017-01-01 PROCEDURE — 86901 BLOOD TYPING SEROLOGIC RH(D): CPT | Performed by: PHYSICIAN ASSISTANT

## 2017-01-01 PROCEDURE — 0B21XEZ CHANGE ENDOTRACHEAL AIRWAY IN TRACHEA, EXTERNAL APPROACH: ICD-10-PCS | Performed by: EMERGENCY MEDICINE

## 2017-01-01 PROCEDURE — 82947 ASSAY GLUCOSE BLOOD QUANT: CPT

## 2017-01-01 PROCEDURE — 93306 TTE W/DOPPLER COMPLETE: CPT

## 2017-01-01 PROCEDURE — 84132 ASSAY OF SERUM POTASSIUM: CPT

## 2017-01-01 PROCEDURE — 83735 ASSAY OF MAGNESIUM: CPT | Performed by: PHYSICIAN ASSISTANT

## 2017-01-01 PROCEDURE — 96365 THER/PROPH/DIAG IV INF INIT: CPT

## 2017-01-01 PROCEDURE — 80053 COMPREHEN METABOLIC PANEL: CPT | Performed by: EMERGENCY MEDICINE

## 2017-01-01 PROCEDURE — 86850 RBC ANTIBODY SCREEN: CPT | Performed by: PHYSICIAN ASSISTANT

## 2017-01-01 PROCEDURE — 87040 BLOOD CULTURE FOR BACTERIA: CPT | Performed by: NURSE PRACTITIONER

## 2017-01-01 PROCEDURE — 92950 HEART/LUNG RESUSCITATION CPR: CPT

## 2017-01-01 PROCEDURE — 84295 ASSAY OF SERUM SODIUM: CPT

## 2017-01-01 PROCEDURE — 82803 BLOOD GASES ANY COMBINATION: CPT

## 2017-01-01 PROCEDURE — 5A1955Z RESPIRATORY VENTILATION, GREATER THAN 96 CONSECUTIVE HOURS: ICD-10-PCS | Performed by: EMERGENCY MEDICINE

## 2017-01-01 PROCEDURE — 85610 PROTHROMBIN TIME: CPT | Performed by: NURSE PRACTITIONER

## 2017-01-01 PROCEDURE — 94002 VENT MGMT INPAT INIT DAY: CPT

## 2017-01-01 RX ORDER — FENTANYL CITRATE 50 UG/ML
50 INJECTION, SOLUTION INTRAMUSCULAR; INTRAVENOUS EVERY 2 HOUR PRN
Status: DISCONTINUED | OUTPATIENT
Start: 2017-01-01 | End: 2018-01-01

## 2017-01-01 RX ORDER — FENTANYL CITRATE 50 UG/ML
INJECTION, SOLUTION INTRAMUSCULAR; INTRAVENOUS
Status: COMPLETED
Start: 2017-01-01 | End: 2017-01-01

## 2017-01-01 RX ORDER — CALCIUM CHLORIDE 100 MG/ML
SYRINGE (ML) INTRAVENOUS CODE/TRAUMA/SEDATION MEDICATION
Status: COMPLETED | OUTPATIENT
Start: 2017-01-01 | End: 2017-01-01

## 2017-01-01 RX ORDER — NOREPINEPHRINE BITARTRATE 1 MG/ML
INJECTION, SOLUTION INTRAVENOUS
Status: COMPLETED
Start: 2017-01-01 | End: 2017-01-01

## 2017-01-01 RX ORDER — POTASSIUM CHLORIDE 29.8 MG/ML
40 INJECTION INTRAVENOUS ONCE
Status: COMPLETED | OUTPATIENT
Start: 2017-01-01 | End: 2017-01-01

## 2017-01-01 RX ORDER — ASPIRIN 325 MG
325 TABLET ORAL DAILY
Status: DISCONTINUED | OUTPATIENT
Start: 2017-01-01 | End: 2018-01-01

## 2017-01-01 RX ORDER — FENTANYL CITRATE 50 UG/ML
INJECTION, SOLUTION INTRAMUSCULAR; INTRAVENOUS CODE/TRAUMA/SEDATION MEDICATION
Status: COMPLETED | OUTPATIENT
Start: 2017-01-01 | End: 2017-01-01

## 2017-01-01 RX ORDER — AMIODARONE HYDROCHLORIDE 50 MG/ML
INJECTION, SOLUTION INTRAVENOUS CODE/TRAUMA/SEDATION MEDICATION
Status: COMPLETED | OUTPATIENT
Start: 2017-01-01 | End: 2017-01-01

## 2017-01-01 RX ORDER — CHLORHEXIDINE GLUCONATE 0.12 MG/ML
15 RINSE ORAL EVERY 12 HOURS SCHEDULED
Status: DISCONTINUED | OUTPATIENT
Start: 2017-01-01 | End: 2018-01-01

## 2017-01-01 RX ORDER — PANTOPRAZOLE SODIUM 40 MG/1
40 INJECTION, POWDER, FOR SOLUTION INTRAVENOUS
Status: DISCONTINUED | OUTPATIENT
Start: 2017-01-01 | End: 2018-01-01

## 2017-01-01 RX ORDER — POTASSIUM CHLORIDE 14.9 MG/ML
20 INJECTION INTRAVENOUS
Status: DISCONTINUED | OUTPATIENT
Start: 2017-01-01 | End: 2017-01-01 | Stop reason: SDUPTHER

## 2017-01-01 RX ORDER — EPINEPHRINE 0.1 MG/ML
SYRINGE (ML) INJECTION CODE/TRAUMA/SEDATION MEDICATION
Status: COMPLETED | OUTPATIENT
Start: 2017-01-01 | End: 2017-01-01

## 2017-01-01 RX ORDER — HEPARIN SODIUM 1000 [USP'U]/ML
4000 INJECTION, SOLUTION INTRAVENOUS; SUBCUTANEOUS AS NEEDED
Status: DISCONTINUED | OUTPATIENT
Start: 2017-01-01 | End: 2018-01-01

## 2017-01-01 RX ORDER — HYDRALAZINE HYDROCHLORIDE 20 MG/ML
5 INJECTION INTRAMUSCULAR; INTRAVENOUS EVERY 6 HOURS PRN
Status: DISCONTINUED | OUTPATIENT
Start: 2017-01-01 | End: 2018-01-01

## 2017-01-01 RX ORDER — HEPARIN SODIUM 1000 [USP'U]/ML
4000 INJECTION, SOLUTION INTRAVENOUS; SUBCUTANEOUS ONCE
Status: COMPLETED | OUTPATIENT
Start: 2017-01-01 | End: 2017-01-01

## 2017-01-01 RX ORDER — METOPROLOL TARTRATE 5 MG/5ML
5 INJECTION INTRAVENOUS EVERY 6 HOURS
Status: DISCONTINUED | OUTPATIENT
Start: 2017-01-01 | End: 2018-01-01

## 2017-01-01 RX ORDER — CHLORHEXIDINE GLUCONATE 0.12 MG/ML
15 RINSE ORAL EVERY 12 HOURS SCHEDULED
Status: DISCONTINUED | OUTPATIENT
Start: 2017-01-01 | End: 2017-01-01

## 2017-01-01 RX ORDER — LIDOCAINE HYDROCHLORIDE 10 MG/ML
INJECTION, SOLUTION EPIDURAL; INFILTRATION; INTRACAUDAL; PERINEURAL
Status: DISPENSED
Start: 2017-01-01 | End: 2017-01-01

## 2017-01-01 RX ORDER — SODIUM BICARBONATE 84 MG/ML
INJECTION, SOLUTION INTRAVENOUS CODE/TRAUMA/SEDATION MEDICATION
Status: COMPLETED | OUTPATIENT
Start: 2017-01-01 | End: 2017-01-01

## 2017-01-01 RX ORDER — FAMOTIDINE 20 MG/1
20 TABLET, FILM COATED ORAL
Status: DISCONTINUED | OUTPATIENT
Start: 2017-01-01 | End: 2017-01-01

## 2017-01-01 RX ORDER — MAGNESIUM SULFATE HEPTAHYDRATE 40 MG/ML
2 INJECTION, SOLUTION INTRAVENOUS ONCE
Status: COMPLETED | OUTPATIENT
Start: 2017-01-01 | End: 2017-01-01

## 2017-01-01 RX ORDER — SCOLOPAMINE TRANSDERMAL SYSTEM 1 MG/1
1 PATCH, EXTENDED RELEASE TRANSDERMAL
Status: DISCONTINUED | OUTPATIENT
Start: 2017-01-01 | End: 2018-01-01 | Stop reason: HOSPADM

## 2017-01-01 RX ORDER — HEPARIN SODIUM 10000 [USP'U]/100ML
3-20 INJECTION, SOLUTION INTRAVENOUS
Status: DISCONTINUED | OUTPATIENT
Start: 2017-01-01 | End: 2018-01-01

## 2017-01-01 RX ORDER — ASPIRIN 325 MG
325 TABLET ORAL ONCE
Status: COMPLETED | OUTPATIENT
Start: 2017-01-01 | End: 2017-01-01

## 2017-01-01 RX ORDER — BUSPIRONE HYDROCHLORIDE 10 MG/1
30 TABLET ORAL EVERY 8 HOURS
Status: DISCONTINUED | OUTPATIENT
Start: 2017-01-01 | End: 2018-01-01

## 2017-01-01 RX ORDER — HEPARIN SODIUM 1000 [USP'U]/ML
2000 INJECTION, SOLUTION INTRAVENOUS; SUBCUTANEOUS AS NEEDED
Status: DISCONTINUED | OUTPATIENT
Start: 2017-01-01 | End: 2018-01-01

## 2017-01-01 RX ORDER — SODIUM CHLORIDE, SODIUM GLUCONATE, SODIUM ACETATE, POTASSIUM CHLORIDE, MAGNESIUM CHLORIDE, SODIUM PHOSPHATE, DIBASIC, AND POTASSIUM PHOSPHATE .53; .5; .37; .037; .03; .012; .00082 G/100ML; G/100ML; G/100ML; G/100ML; G/100ML; G/100ML; G/100ML
500 INJECTION, SOLUTION INTRAVENOUS ONCE
Status: COMPLETED | OUTPATIENT
Start: 2017-01-01 | End: 2017-01-01

## 2017-01-01 RX ORDER — SODIUM CHLORIDE, SODIUM GLUCONATE, SODIUM ACETATE, POTASSIUM CHLORIDE, MAGNESIUM CHLORIDE, SODIUM PHOSPHATE, DIBASIC, AND POTASSIUM PHOSPHATE .53; .5; .37; .037; .03; .012; .00082 G/100ML; G/100ML; G/100ML; G/100ML; G/100ML; G/100ML; G/100ML
10 INJECTION, SOLUTION INTRAVENOUS CONTINUOUS
Status: DISCONTINUED | OUTPATIENT
Start: 2017-01-01 | End: 2018-01-01 | Stop reason: HOSPADM

## 2017-01-01 RX ADMIN — EPINEPHRINE 1 MG: 0.1 INJECTION, SOLUTION ENDOTRACHEAL; INTRACARDIAC; INTRAVENOUS at 18:13

## 2017-01-01 RX ADMIN — HEPARIN SODIUM 2000 UNITS: 1000 INJECTION, SOLUTION INTRAVENOUS; SUBCUTANEOUS at 14:32

## 2017-01-01 RX ADMIN — AMIODARONE HYDROCHLORIDE 150 MG: 50 INJECTION, SOLUTION INTRAVENOUS at 17:40

## 2017-01-01 RX ADMIN — CHLORHEXIDINE GLUCONATE 15 ML: 1.2 RINSE ORAL at 09:52

## 2017-01-01 RX ADMIN — METOPROLOL TARTRATE 5 MG: 1 INJECTION, SOLUTION INTRAVENOUS at 22:22

## 2017-01-01 RX ADMIN — POTASSIUM CHLORIDE 40 MEQ: 400 INJECTION, SOLUTION INTRAVENOUS at 18:24

## 2017-01-01 RX ADMIN — BUSPIRONE HYDROCHLORIDE 30 MG: 10 TABLET ORAL at 18:35

## 2017-01-01 RX ADMIN — HYDRALAZINE HYDROCHLORIDE 5 MG: 20 INJECTION INTRAMUSCULAR; INTRAVENOUS at 21:39

## 2017-01-01 RX ADMIN — ASPIRIN 325 MG: 325 TABLET ORAL at 12:40

## 2017-01-01 RX ADMIN — EPINEPHRINE 1 MG: 0.1 INJECTION, SOLUTION ENDOTRACHEAL; INTRACARDIAC; INTRAVENOUS at 17:31

## 2017-01-01 RX ADMIN — NOREPINEPHRINE BITARTRATE 10 MCG: 1 INJECTION INTRAVENOUS at 18:20

## 2017-01-01 RX ADMIN — POTASSIUM CHLORIDE 40 MEQ: 400 INJECTION, SOLUTION INTRAVENOUS at 21:31

## 2017-01-01 RX ADMIN — HEPARIN SODIUM AND DEXTROSE 10 UNITS/KG/HR: 10000; 5 INJECTION INTRAVENOUS at 14:33

## 2017-01-01 RX ADMIN — FAMOTIDINE 20 MG: 10 INJECTION, SOLUTION INTRAVENOUS at 06:10

## 2017-01-01 RX ADMIN — ASPIRIN 325 MG: 325 TABLET ORAL at 05:18

## 2017-01-01 RX ADMIN — CHLORHEXIDINE GLUCONATE 15 ML: 1.2 RINSE ORAL at 08:39

## 2017-01-01 RX ADMIN — CALCIUM CHLORIDE 1 G: 100 INJECTION, SOLUTION INTRAVENOUS at 17:38

## 2017-01-01 RX ADMIN — POTASSIUM CHLORIDE 40 MEQ: 400 INJECTION, SOLUTION INTRAVENOUS at 06:43

## 2017-01-01 RX ADMIN — AMIODARONE HYDROCHLORIDE 1 MG/MIN: 50 INJECTION, SOLUTION INTRAVENOUS at 11:26

## 2017-01-01 RX ADMIN — CALCIUM CHLORIDE 1 G: 100 INJECTION, SOLUTION INTRAVENOUS at 18:18

## 2017-01-01 RX ADMIN — SODIUM BICARBONATE 50 MEQ: 84 INJECTION, SOLUTION INTRAVENOUS at 18:13

## 2017-01-01 RX ADMIN — NOREPINEPHRINE BITARTRATE 10 MCG/MIN: 1 INJECTION, SOLUTION, CONCENTRATE INTRAVENOUS at 18:20

## 2017-01-01 RX ADMIN — SODIUM CHLORIDE, SODIUM GLUCONATE, SODIUM ACETATE, POTASSIUM CHLORIDE, MAGNESIUM CHLORIDE, SODIUM PHOSPHATE, DIBASIC, AND POTASSIUM PHOSPHATE 100 ML/HR: .53; .5; .37; .037; .03; .012; .00082 INJECTION, SOLUTION INTRAVENOUS at 16:57

## 2017-01-01 RX ADMIN — MAGNESIUM SULFATE HEPTAHYDRATE 2 G: 40 INJECTION, SOLUTION INTRAVENOUS at 20:05

## 2017-01-01 RX ADMIN — AMIODARONE HYDROCHLORIDE 1 MG/MIN: 50 INJECTION, SOLUTION INTRAVENOUS at 20:30

## 2017-01-01 RX ADMIN — INSULIN LISPRO 1 UNITS: 100 INJECTION, SOLUTION INTRAVENOUS; SUBCUTANEOUS at 06:43

## 2017-01-01 RX ADMIN — HEPARIN SODIUM 4000 UNITS: 1000 INJECTION, SOLUTION INTRAVENOUS; SUBCUTANEOUS at 05:19

## 2017-01-01 RX ADMIN — EPINEPHRINE 1 MG: 0.1 INJECTION, SOLUTION ENDOTRACHEAL; INTRACARDIAC; INTRAVENOUS at 18:17

## 2017-01-01 RX ADMIN — PANTOPRAZOLE SODIUM 40 MG: 40 INJECTION, POWDER, FOR SOLUTION INTRAVENOUS at 08:39

## 2017-01-01 RX ADMIN — SODIUM BICARBONATE 50 MEQ: 84 INJECTION INTRAVENOUS at 21:31

## 2017-01-01 RX ADMIN — INSULIN LISPRO 2 UNITS: 100 INJECTION, SOLUTION INTRAVENOUS; SUBCUTANEOUS at 12:30

## 2017-01-01 RX ADMIN — FENTANYL CITRATE 50 MCG: 50 INJECTION, SOLUTION INTRAMUSCULAR; INTRAVENOUS at 18:35

## 2017-01-01 RX ADMIN — NOREPINEPHRINE BITARTRATE 8 MCG/MIN: 1 INJECTION, SOLUTION, CONCENTRATE INTRAVENOUS at 19:30

## 2017-01-01 RX ADMIN — SODIUM CHLORIDE, SODIUM GLUCONATE, SODIUM ACETATE, POTASSIUM CHLORIDE, MAGNESIUM CHLORIDE, SODIUM PHOSPHATE, DIBASIC, AND POTASSIUM PHOSPHATE 125 ML/HR: .53; .5; .37; .037; .03; .012; .00082 INJECTION, SOLUTION INTRAVENOUS at 20:00

## 2017-01-01 RX ADMIN — SODIUM BICARBONATE 50 MEQ: 84 INJECTION PARENTERAL at 17:38

## 2017-01-01 RX ADMIN — HYDRALAZINE HYDROCHLORIDE 5 MG: 20 INJECTION INTRAMUSCULAR; INTRAVENOUS at 04:22

## 2017-01-01 RX ADMIN — HEPARIN SODIUM AND DEXTROSE 12 UNITS/KG/HR: 10000; 5 INJECTION INTRAVENOUS at 18:45

## 2017-01-01 RX ADMIN — EPINEPHRINE 1 MG: 0.1 INJECTION, SOLUTION ENDOTRACHEAL; INTRACARDIAC; INTRAVENOUS at 17:54

## 2017-01-01 RX ADMIN — BUSPIRONE HYDROCHLORIDE 20 MG: 10 TABLET ORAL at 12:38

## 2017-01-01 RX ADMIN — HEPARIN SODIUM AND DEXTROSE 12 UNITS/KG/HR: 10000; 5 INJECTION INTRAVENOUS at 05:19

## 2017-01-01 RX ADMIN — CHLORHEXIDINE GLUCONATE 15 ML: 1.2 RINSE ORAL at 22:22

## 2017-01-01 RX ADMIN — PANTOPRAZOLE SODIUM 40 MG: 40 INJECTION, POWDER, FOR SOLUTION INTRAVENOUS at 05:45

## 2017-01-01 RX ADMIN — CHLORHEXIDINE GLUCONATE 15 ML: 1.2 RINSE ORAL at 21:39

## 2017-01-01 RX ADMIN — SCOPALAMINE 1 PATCH: 1 PATCH, EXTENDED RELEASE TRANSDERMAL at 00:00

## 2017-01-01 RX ADMIN — INSULIN LISPRO 1 UNITS: 100 INJECTION, SOLUTION INTRAVENOUS; SUBCUTANEOUS at 18:52

## 2017-01-01 RX ADMIN — SODIUM CHLORIDE, SODIUM GLUCONATE, SODIUM ACETATE, POTASSIUM CHLORIDE, MAGNESIUM CHLORIDE, SODIUM PHOSPHATE, DIBASIC, AND POTASSIUM PHOSPHATE 500 ML: .53; .5; .37; .037; .03; .012; .00082 INJECTION, SOLUTION INTRAVENOUS at 01:13

## 2017-01-01 RX ADMIN — FAMOTIDINE 20 MG: 10 INJECTION, SOLUTION INTRAVENOUS at 16:00

## 2017-01-01 RX ADMIN — METOPROLOL TARTRATE 5 MG: 1 INJECTION, SOLUTION INTRAVENOUS at 12:39

## 2017-01-01 RX ADMIN — SODIUM CHLORIDE 2000 ML: 0.9 INJECTION, SOLUTION INTRAVENOUS at 17:53

## 2017-01-01 RX ADMIN — AMIODARONE HYDROCHLORIDE 1 MG/MIN: 50 INJECTION, SOLUTION INTRAVENOUS at 13:59

## 2017-01-01 RX ADMIN — CHLORHEXIDINE GLUCONATE 15 ML: 1.2 RINSE ORAL at 20:24

## 2017-01-01 RX ADMIN — FAMOTIDINE 20 MG: 10 INJECTION, SOLUTION INTRAVENOUS at 06:45

## 2017-01-01 RX ADMIN — SODIUM CHLORIDE, SODIUM GLUCONATE, SODIUM ACETATE, POTASSIUM CHLORIDE, MAGNESIUM CHLORIDE, SODIUM PHOSPHATE, DIBASIC, AND POTASSIUM PHOSPHATE 125 ML/HR: .53; .5; .37; .037; .03; .012; .00082 INJECTION, SOLUTION INTRAVENOUS at 05:35

## 2017-01-01 RX ADMIN — SODIUM CHLORIDE, SODIUM GLUCONATE, SODIUM ACETATE, POTASSIUM CHLORIDE, MAGNESIUM CHLORIDE, SODIUM PHOSPHATE, DIBASIC, AND POTASSIUM PHOSPHATE 125 ML/HR: .53; .5; .37; .037; .03; .012; .00082 INJECTION, SOLUTION INTRAVENOUS at 22:58

## 2017-01-01 RX ADMIN — BUSPIRONE HYDROCHLORIDE 30 MG: 10 TABLET ORAL at 05:18

## 2017-01-01 RX ADMIN — SODIUM BICARBONATE 50 MEQ: 84 INJECTION, SOLUTION INTRAVENOUS at 18:18

## 2017-01-01 RX ADMIN — METOPROLOL TARTRATE 5 MG: 1 INJECTION, SOLUTION INTRAVENOUS at 16:54

## 2017-12-29 PROBLEM — J96.00 ACUTE RESPIRATORY FAILURE (HCC): Status: ACTIVE | Noted: 2017-01-01

## 2017-12-29 PROBLEM — R04.2 HEMOPTYSIS: Status: ACTIVE | Noted: 2017-01-01

## 2017-12-29 PROBLEM — I47.2 V-TACH (HCC): Status: ACTIVE | Noted: 2017-01-01

## 2017-12-29 PROBLEM — E87.6 HYPOKALEMIA: Status: ACTIVE | Noted: 2017-01-01

## 2017-12-29 PROBLEM — E87.2 METABOLIC ACIDOSIS: Status: ACTIVE | Noted: 2017-01-01

## 2017-12-29 PROBLEM — E87.2 LACTIC ACIDOSIS: Status: ACTIVE | Noted: 2017-01-01

## 2017-12-29 PROBLEM — I46.9 CARDIAC ARREST (HCC): Status: ACTIVE | Noted: 2017-01-01

## 2017-12-29 NOTE — ED ATTENDING ATTESTATION
Ted Bill DO, saw and evaluated the patient  I have discussed the patient with the resident/non-physician practitioner and agree with the resident's/non-physician practitioner's findings, Plan of Care, and MDM as documented in the resident's/non-physician practitioner's note, except where noted  All available labs and Radiology studies were reviewed  At this point I agree with the current assessment done in the Emergency Department  I have conducted an independent evaluation of this patient a history and physical is as follows:    Patient presents via EMS for cardiac arrest   She was home with family when she had a witnessed collapse and appearance of respiratory arrest   911 was called and fire service arrived on scene first   They detected no pulse and initiated CPR  They applied an AED which recommended shock twice prior to EMS arrival   EMS indicates they found her in asystole at which time they initiated ACLS protocols including intubation and epinephrine via IV  She was noted to have blood in her airway but was otherwise easy intubation  However, a 6 5 millimeter ETT was placed due to the presence of the blood  On route she was noted to be in PEA and had persistent hypoxia  The family reported she had no medical problems and takes no medications but was unable to indicate when she last saw a physician  Initially the  stated he did not want her to have CPR or invasive procedures  However her other family advocated for full code resuscitation  In the ED, she initially was in PEA, with continued CPR and ACLS protocol  She developed a wide complex tachycardia with a pulse for which she was given amiodarone bolus and infusion  With return of pulses she had an adequate blood pressure  Initial labs indicated hypokalemia at 2 9 with hypoxemia for which she was given potassium replenishment, bicarb and calcium    She did have another episode of bradycardia and then hypotension leading to pulselessness for which CPR was re-initiated, epinephrine given with return of spontaneous circulation for which norepi infusion was begun  At this time critical care was available in the emergency department and co-managed the patient including reintubating with an 8 0 ETT in order to perform bedside bronchoscopy  Patient continued to have bloody discharge from the ET tube and poor oxygenation  No recent travel or sick contacts  ROS:  Unable to be completed due to patient condition  No report from family of prodromal symptoms or complaints  PE:  Significant distress, unresponsive; pupils 4 millimeters and fixed; MMM; after return of spontaneous circulation HRR, no murmur, monitor shows STach at 102 bpm; lungs diminished on the left with diffuse rhonchi but no wheezes, POx 89-90% on 100% O2 via BVM/vent (hypoxic); abdomen s/nt/nd, nl BS in all 4 quadrant; (-) LE edema; skin pale, warm, dry; CNs unable to be measured though no apparent focal deficits  DDx:  Cardiac arrest likely caused by respiratory arrest - ACS/MI, PE, multilobar pneumonia, flash pulmonary edema  A/P: Will check cardiac/respiratory workup, treat symptoms with norepinephrine infusion, admit to critical care  Critical Care Time  The patient presented with a condition in which there was a high probability of imminent or life-threatening deterioration, and critical care services (excluding separately billable procedures) totalled  minutes          Procedures

## 2017-12-29 NOTE — ED PROCEDURE NOTE
Procedure  Intubation  Date/Time: 12/29/2017 6:00 PM  Performed by: Bubba James by: Gerald Alaniz     Patient location:  ED  Consent:     Consent obtained:  Emergent situation  Universal protocol:     Imaging studies available: yes      Required blood products, implants, devices, and special equipment available: yes      Site/side marked: yes      Patient identity confirmed:  Hospital-assigned identification number and arm band  Pre-procedure details:     Patient status:  Unresponsive  Indications:     Indications for intubation comment:  Tube exchange from 6 5 to 8 0, blood in tube  Procedure details:     Intubation method:  Oral    Oral intubation technique:  Glidescope    Laryngoscope blade: Mac 3    Tube size (mm):  8 0    Tube type:  Cuffed    Number of attempts:  1    Tube visualized through cords: yes    Placement assessment:     ETT to lip:  23    Tube secured with:  ETT hickman    Breath sounds:  Reduced on left and reduced on right    Placement verification: chest rise, condensation, CXR verification, direct visualization, equal breath sounds and ETCO2 detector    Post-procedure details:     Patient tolerance of procedure:   Tolerated well, no immediate complications

## 2017-12-30 PROBLEM — R74.01 TRANSAMINITIS: Status: ACTIVE | Noted: 2017-01-01

## 2017-12-30 PROBLEM — G93.40 ACUTE ENCEPHALOPATHY: Status: ACTIVE | Noted: 2017-01-01

## 2017-12-30 PROBLEM — E83.42 HYPOMAGNESEMIA: Status: ACTIVE | Noted: 2017-01-01

## 2017-12-30 NOTE — PROCEDURES
Continuous Video EEG Monitoring       Patient Name:  Catracho Manuel  MRN: [id number]   :  1950 File #: [id number]   Age: 78 y o  Encounter #: [id number]   Date Performed: 2017  Date EEG Reviewed: 2017    Report date: 2017          Study type: Continuous video EEG    ICD 10 diagnosis: anoxic encephalopathy    Start time:  End time: 0800 on      -------------------------------------------------------------------------------------------------------------------   Patient History:  78year old female presents after cardiac arrest  Per  pt was sitting in kitchen when he heard her groaning  When he went into the kitchen he found her slumped in chair,  began cpr and called EMS  Patient had ROSC enroute to hospital but went into [facility]  Patient reportedly  initially had a pulse and was given an amiodarone bolus but thn proceeded to PEA  While in ICU patientt was noted to have facial twitching  This recording was performed to determine whether episodes of facial twitching are seizures and to determine if patient is having subtle/subclinical electrographic seizures  Medications include:   busPIRone 30 mg Oral Q8H   chlorhexidine 15 mL Swish & Spit Q12H [address]   famotidine 20 mg Oral BID AC   Or      famotidine 20 mg Intravenous BID AC   insulin lispro 1-6 Units Subcutaneous Q6H [address]   lidocaine (PF)      pantoprazole 40 mg Intravenous Early Morning     The patient is not receiving intravenous sedation   -------------------------------------------------------------------------------------------------------------------   Description of Procedure:  · 32 channel digital recording with electrodes placed according to the International 10-20 system with additional T1/T2 electrodes, EOG, EKG, and simultaneous video  A monitoring technologist supervised the continuous recording   The recording was technically satisfactory  -------------------------------------------------------------------------------------------------------------------   Results:   Background Activity:   Throughout the recording the background activity is continuously diffusely suppressed except for rare one second bursts of high amplitude poorly organized diffuse intermixed theta and delta slowing, which do occur increasingly frequently as the study progresses  Activation Procedures:   Neither hyperventilation nor photic stimulation was performed  Abnormal Findings:  See "Background Activity"  No focal abnormalities nor interictal epileptiform discharges were noted  No electrographic seizures occurred during this recording  Other findings: The single lead EKG demonstrated a regular  rhythm  Events:   No push button events occurred during this study  No periods of facial twitching were noted  -------------------------------------------------------------------------------------------------------------------   Interpretation:   Markedly abnormal 8 hour continuous video-EEG recording, due to nearly continuous diffuse suppression of the background activity, rare one second bursts of high amplitude intermixed theta and delta activity, which do appear to occur more frequently as the study progresses  This is thus essentially a burst-suppression pattern with the proportion of periods of suppression being far greater than the periods where some EEG activity appears  This pattern indicates severe diffuse cerebral dysfunction which may be due to the patient's temperature ranging from 96 to 97 degrees Fahrenheit  EEG will be more indicative of the patient's inherent cerebral function once she is normothermic  The EEG does indicate however the patient is not having electrographic seizures                  Lindsay Marroquin, [address] [facility]  Pager # [phone]

## 2017-12-30 NOTE — PROCEDURES
Bronchoscopy  Date/Time: 12/29/2017 5:57 PM  Performed by: Gabrielle Gallardo  Authorized by: Gabrielle Gallardo     Patient location:  Bedside and ED  Consent:     Consent obtained:  Emergent situation  Universal protocol:     Patient identity confirmed:  Arm band  Indications:     Procedure Purpose: diagnostic      Indications: pneumonia/infiltrate and hemoptysis    Anesthesia (see MAR for exact dosages): Anesthesia method:  None  Airway:     Airway:  Endotracheal tube positioned above hillary  No obvious blood clots nor mucus plugging appreciated  Source of hemoptysis not identified    Final Diagnosis/Findings:      Bilateral pulmonary opacities  Hemoptysis  Procedure does not include criticalCare time

## 2017-12-30 NOTE — PROGRESS NOTES
Critical Care Interval Progress Note     Missy Standing 78 y o  female MRN: [id number]    Unit/Bed#: [facility] 515-01 Encounter: [id number]    Impression:  [address], [address] Encephalopathy-Likely Anoxic  Hypoxic Respiratory Failure  R facial and Chest Myoclonus  Troponin leak post code with No ST Elevation  Lactic Acidosis due to above    Plan:  TTM  cEEG  Wean Norepi to keep MAP >65  ASA  ACS Heparin Infusion-Hold for Hemoptysis  2D ECHO  Follow Endpoints      Counseling / Coordination of Care: Total Critical Care time spent 45 minutes excluding procedures, teaching and family updates  Over the evening of    ______________________________________________________________________    Chief Complaint: Witnessed Cardiac Arrest        Vitals:   Vitals:    17 0515 17 0530 17 0545 17 0600   BP:       Pulse: 70 70 70 68   Resp:    22   Temp:       TempSrc:       SpO2:    91%   Weight:       Height:         Arterial Line BP: 124/68  Arterial Line MAP (mmHg): 86 mmHg    Temperature: Temp (24hrs), Av 5 °F (35 8 °C), Min:95 7 °F (35 4 °C), Max:97 5 °F (36 4 °C)  Current: Temperature: 97 5 °F (36 4 °C)    Hemodynamic Monitoring:  N/A       Respiratory:  SpO2: SpO2: 91 %       Physical Exam:  Physical Exam   Constitutional: She appears well-developed and well-nourished  HENT:   Head: Normocephalic and atraumatic  Cardiovascular: Normal rate, regular rhythm, normal heart sounds and intact distal pulses  Exam reveals no friction rub  No murmur heard  Abdominal: Soft  Musculoskeletal: She exhibits no edema or deformity  Neurological: She is unresponsive  GCS eye subscore is 2  GCS verbal subscore is 1  GCS motor subscore is 1       Pupils 3mm Bilat and sluggish  +corneal  +gag  Intermittent spont eye opening   +cough  No response to painful stim      Allergies: No Known Allergies    Medications:   Scheduled Meds:  busPIRone 30 mg Oral Q8H   chlorhexidine 15 mL Swish & Spit Q12H [facility]   EPINEPHrine      famotidine 20 mg Oral BID AC   Or      famotidine 20 mg Intravenous BID AC   insulin lispro 1-6 Units Subcutaneous Q6H [facility]   sodium bicarbonate        Continuous Infusions:  amiodarone 1 mg/min Last Rate: 1 mg/min (12/29/17 2030)   heparin (porcine) 3-20 Units/kg/hr (Order-Specific) Last Rate: 12 Units/kg/hr (12/30/17 0519)   multi-electrolyte 125 mL/hr Last Rate: 125 mL/hr (12/30/17 0535)   norepinephrine 1-30 mcg/min Last Rate: Stopped (12/30/17 0532)     PRN Meds:    fentanyl citrate (PF) 50 mcg Q2H PRN   heparin (porcine) 2,000 Units PRN   heparin (porcine) 4,000 Units PRN       Labs:     Results from last 7 days  Lab Units 12/30/17  0511 12/30/17  0338 12/29/17 2311 12/29/17 1947   WBC Thousand/uL 8 36 7 62 6 86 13 62*   HEMOGLOBIN g/dL 12 1 12 4 12 7 13 0   HEMATOCRIT % 35 9 35 6 37 2 39 1   PLATELETS Thousands/uL 145* 147* 166 177   NEUTROS PCT % 88*  --  84* 80*   MONOS PCT % 5  --  3* 1*       Results from last 7 days  Lab Units 12/30/17  0338 12/29/17 2311 12/29/17  1947 12/29/17  1800 12/29/17  1742   SODIUM mmol/L  --  148* 144  --  140   POTASSIUM mmol/L 3 6 4 0 3 6  --  3 1*   CHLORIDE mmol/L  --  111* 108  --  103   CO2 mmol/L  --  25 22  --  20*   BUN mg/dL  --  26* 23  --  21   CREATININE mg/dL  --  1 23 1 20  --  1 20   CALCIUM mg/dL  --  8 9 9 7  --  8 7   TOTAL PROTEIN g/dL  --   --  5 6*  --  6 3*   BILIRUBIN TOTAL mg/dL  --   --  0 41  --  0 29   ALK PHOS U/L  --   --  99  --  83   ALT U/L  --   --  372*  --  300*   AST U/L  --   --  523*  --  357*   GLUCOSE RANDOM mg/dL  --  174* 234*  --  282*   GLUCOSE, ISTAT mg/dl  --   --   --  312*  --        Results from last 7 days  Lab Units 12/29/17  2311 12/29/17  1947   MAGNESIUM mg/dL 2 7* 2 2       Results from last 7 days  Lab Units 12/29/17  2311   PHOSPHORUS mg/dL 4 1        Results from last 7 days  Lab Units 12/30/17  0338 12/29/17  1742   INR  1 30* 1 33*   PTT seconds 28 41*       Results from last 7 days  Lab Units 12/30/17  0338 12/29/17 2311 12/29/17 1947   LACTIC ACID mmol/L 5 4* 6 2* 8 4*       0  Lab Value Date/Time   TROPONINI 34 00 (H) 12/30/2017 0222   TROPONINI 27 70 (H) 12/29/2017 2311   TROPONINI 9 15 (H) 12/29/2017 1947       Results from last 7 days  Lab Units 12/30/17  0539 12/29/17 2312 12/29/17 2017   PH ART  7 410 7 348* 7 212*   PCO2 ART mm Hg 34 9* 40 1 46 3*   PO2 ART mm Hg 94 5 184 0* 70 3*   HCO3 ART mmol/L 21 6* 21 5* 18 2*   BASE EXC ART mmol/L -2 4 -3 8 -9 5   ABG SOURCE  Line, Arterial Line, Arterial Line, Arterial       Diagnostic Imaging / Data: I have personally reviewed pertinent films in PACS  EKG: SR with T Wave abl in III  Code Status: Level 1 - Full Code    Portions of the record may have been created with voice recognition software  Occasional wrong word or "sound a like" substitutions may have occurred due to the inherent limitations of voice recognition software  Read the chart carefully and recognize, using context, where substitutions have occurred      SIGNATURE: Americo Pallas, MD  DATE: December 30, 2017  TIME: 6:14 AM

## 2017-12-30 NOTE — PROGRESS NOTES
Progress Note - Critical Care   Yue Breeding 78 y o  female MRN: [id number]  Unit/Bed#: [facility] 515-01 Encounter: [id number]    Assessment:   Principal Problem:    Cardiac arrest  Active Problems:    V-tach    Acute respiratory failure    Metabolic acidosis    Lactic acidosis    Hypokalemia    Hemoptysis    Hypomagnesemia    Transaminitis    Acute encephalopathy    Plan:   Neuro:   · Analgesia/sedation: no continuous infusions at this time  Fentanyl 50mcg q2 prn for shivering  · cEEG monitoring, no evidence of seizure activity at this time    CV:   · MAP goal >65, weaned off Levophed overnight  · Follow up official echo today  · Cardiology consult  · Trend troponins  · Amiodarone gtt at 1 mcg  · D/c femoral line placed in ER, sterile IJ placed overnight    Lung:   · Maintain mechanical ventilation: AC 18/450/12/60%  Wean FiO2 as tolerated  Not a candidate for SBT at this time  · Respiratory protocol    GI:   · Protonix for GI ppx    FEN:   · Maintenance fluids at 125cc/hr  · Aggressive repletion of electrolytes  · NPO    :   · Monitor I/Os  · Maintain wagner    ID:   · Remains afebrile, no leukocytosis  No indication for antibiotics at this time  Now on TTM, monitor clinically for signs of infection  · Follow up blood cultures  · Flu pending    Heme:   · Heparin gtt started this morning  Check PTT in 6 hours  · SCDs    Endo:   · SSI algorithm 3    Msk/Skin:   · Frequent turns and repositioning     Disposition:   · ICU    ______________________________________________________________________  Chief Complaint: Unable to give complaint    HPI/24hr events: Patient admitted to the ICU s/p cardiac arrest at home  Tube exchange completed in ER w/ subsequent bronchoscopy for bloody secretions from ETT  Levophed as high as 10 overnight, weaned to off this morning   Patient was started on continuous EEG monitoring overnight w/ no reported seizure activity from epileptologist  Patient was initiated on targeted temperature management around 5am  Prior to initiation, patient's temperature was 36 degrees w/o intervention  Overnight, given 500cc bolus of crystalloid for low urine output  Also given 2 amps bicarb overnight  Started on heparin gtt this morning    ______________________________________________________________________  Temperature:   Temp (24hrs), Av 5 °F (35 8 °C), Min:95 7 °F (35 4 °C), Max:97 5 °F (36 4 °C)    Current Temperature: 97 5 °F (36 4 °C)    Vitals:    17 0635 17 0700 17 0800 17 0811   BP:       Pulse:  66 62 63   Resp:  (!) 24 (!) 24    Temp:       TempSrc:       SpO2: 100% 100% 100% 100%   Weight:       Height:         Arterial Line BP: 122/68  Arterial Line MAP (mmHg): 82 mmHg     Weights:   IBW: 61 6 kg    Body mass index is 30 21 kg/m²  Weight (last 2 days)     Date/Time   Weight    17 0500  87 5 (192 9)    17 1918  82 1 (181)            Height: 5' 7" (170 2 cm)    Ventilator Settings:   Vent Mode: AC/VC  Resp Rate (BPM): 18 BPM  Vt (mL): 450 mL  FIO2 (%): 60 %  PEEP (cmH2O): 12 cmH2O  SpO2: 100 %    Lab Results   Component Value Date    PHART 7 410 2017    DKT8YYZ 34 9 (L) 2017    PO2ART 94 5 2017    [id number] 21 6 (L) 2017    BEART -2 4 2017    SOURCE Line, Arterial 2017     SpO2: SpO2: 100 %    ______________________________________________________________________  Physical Exam:     Physical Exam   Constitutional: Vital signs are normal  She appears well-developed  Non-toxic appearance  No distress  She is intubated  Elderly female, intubated, unresponsive   HENT:   Head: Normocephalic and atraumatic  cEEG in place   Eyes:   Pupils 2mm and sluggish b/l   Cardiovascular: Normal rate and regular rhythm  Exam reveals no gallop and no friction rub  No murmur heard  Pulmonary/Chest: She is intubated  She has decreased breath sounds in the right lower field and the left lower field     Decreased breath sounds at b/l bases Abdominal: Soft  Normal appearance  Genitourinary:   Genitourinary Comments: Johnson present   Musculoskeletal:   No movement of extremities to painful stimuli   Neurological: GCS eye subscore is 2  GCS verbal subscore is 1  GCS motor subscore is 1  Skin: Skin is dry and intact  She is not diaphoretic     Cool extremities     ______________________________________________________________________  Intake and Outputs:  I/O       12/28 0701 - 12/29 0700 12/29 0701 - 12/30 0700 12/30 0701 - 12/31 0700    I V  (mL/kg)  2350 7 (26 9)     NG/GT  0     IV Piggyback  2350     Total Intake(mL/kg)  4700 7 (53 7)     Urine (mL/kg/hr)  1010     Emesis/NG output  0     Stool  0     Total Output   1010      Net   +3690 7             Unmeasured Stool Occurrence  0 x         UOP: 20-50cc/hour   Nutrition:        Diet Orders            Start     Ordered    12/29/17 1915  Diet NPO  Diet effective now     Question:  Diet Type  Answer:  NPO    12/29/17 1922          Labs:     Results from last 7 days  Lab Units 12/30/17  0511 12/30/17 0338 12/29/17 2311 12/29/17 1947   WBC Thousand/uL 8 36 7 62 6 86 13 62*   HEMOGLOBIN g/dL 12 1 12 4 12 7 13 0   HEMATOCRIT % 35 9 35 6 37 2 39 1   PLATELETS Thousands/uL 145* 147* 166 177   NEUTROS PCT % 88*  --  84* 80*   MONOS PCT % 5  --  3* 1*      Results from last 7 days  Lab Units 12/30/17  0511 12/30/17  0338 12/29/17 2311 12/29/17 1947 12/29/17  1742   SODIUM mmol/L 145  --  148* 144  --  140   POTASSIUM mmol/L 3 6 3 6 4 0 3 6  --  3 1*   CHLORIDE mmol/L 110*  --  111* 108  --  103   CO2 mmol/L 24  --  25 22  --  20*   BUN mg/dL 27*  --  26* 23  --  21   CREATININE mg/dL 1 28  --  1 23 1 20  --  1 20   CALCIUM mg/dL 8 7  --  8 9 9 7  --  8 7   ALBUMIN g/dL 2 4*  --   --  2 6*  --  3 0*   TOTAL PROTEIN g/dL 5 0*  --   --  5 6*  --  6 3*   BILIRUBIN TOTAL mg/dL 0 61  --   --  0 41  --  0 29   ALK PHOS U/L 72  --   --  99  --  83   ALT U/L 325*  --   --  372*  --  300*   AST U/L 791*  -- --  523*  --  357*   GLUCOSE RANDOM mg/dL 183*  --  174* 234*  --  282*   GLUCOSE, ISTAT   --   --   --   --   < >  --    < > = values in this interval not displayed  Results from last 7 days  Lab Units 12/30/17  0511 12/29/17  2311 12/29/17 1947   MAGNESIUM mg/dL 2 4 2 7* 2 2       Results from last 7 days  Lab Units 12/30/17  0511 12/29/17 2311   PHOSPHORUS mg/dL 2 6 4 1        Results from last 7 days  Lab Units 12/30/17  0338 12/29/17 1742   INR  1 30* 1 33*   PTT seconds 28 41*       Results from last 7 days  Lab Units 12/30/17 0338   LACTIC ACID mmol/L 5 4*       0  Lab Value Date/Time   TROPONINI 34 00 (H) 12/30/2017 0222   TROPONINI 27 70 (H) 12/29/2017 2311   TROPONINI 9 15 (H) 12/29/2017 1947     Imaging: Bilateral airspace opacities  R IJ CVC in place  I have personally reviewed pertinent reports  No results found for: Kamille Green, WOUNDCULT, SPUTUMCULTUR  Allergies: No Known Allergies  Medications:   Scheduled Meds:  busPIRone 30 mg Oral Q8H   chlorhexidine 15 mL Swish & Spit Q12H [address]   EPINEPHrine      famotidine 20 mg Oral BID AC   Or      famotidine 20 mg Intravenous BID AC   insulin lispro 1-6 Units Subcutaneous Q6H [address]   potassium chloride 40 mEq Intravenous Once   sodium bicarbonate        Continuous Infusions:    amiodarone 1 mg/min Last Rate: 1 mg/min (12/29/17 2030)   heparin (porcine) 3-20 Units/kg/hr (Order-Specific) Last Rate: 12 Units/kg/hr (12/30/17 0519)   multi-electrolyte 125 mL/hr Last Rate: 125 mL/hr (12/30/17 0535)   norepinephrine 1-30 mcg/min Last Rate: Stopped (12/30/17 0532)     PRN Meds:    fentanyl citrate (PF) 50 mcg Q2H PRN   heparin (porcine) 2,000 Units PRN   heparin (porcine) 4,000 Units PRN     VTE Pharmacologic Prophylaxis:   Pharmacologic: Heparin Drip  Mechanical VTE Prophylaxis in Place: Yes    Invasive lines and devices:   Invasive Devices     Central Venous Catheter Line            CVC Central Lines 12/29/17 Triple Left Femoral less than 1 day    CVC Central Lines 12/30/17 Triple 16cm less than 1 day          Peripheral Intravenous Line            Peripheral IV 12/29/17 Left Antecubital less than 1 day    Peripheral IV 12/29/17 Left Wrist less than 1 day          Arterial Line            Arterial Line 12/29/17 Right Radial less than 1 day          Drain            NG/OG/Enteral Tube Orogastric Center mouth less than 1 day    Urethral Catheter Temperature probe 16 Fr  less than 1 day          Airway            ETT  8 mm less than 1 day                   Counseling / Coordination of Care  Total Critical Care time spent 42 minutes excluding procedures, teaching and family updates        Code Status: Level 1 - Full Code      Hill Rainey PA-C

## 2017-12-30 NOTE — PROGRESS NOTES
Called [facility] reported pt on Vent without sedation and unresponsive GCS of 6  Call returned by coordinatorSantosh  Reviewed past medical hx summary of events and current situation and treatment  Someone from [facility] [address] will be up to review chart

## 2017-12-30 NOTE — RESPIRATORY THERAPY NOTE
RT Protocol Note  Catracho Manuel 78 y o  female MRN: [id number]  Unit/Bed#: [facility] 045-06 Encounter: [id number]    Assessment    Active Problems:    * No active hospital problems  *      Home Pulmonary Medications:  unknown    No past medical history on file  Social History     Social History    Marital status: /Civil Union     Spouse name: N/A    Number of children: N/A    Years of education: N/A     Social History Main Topics    Smoking status: Not on file    Smokeless tobacco: Not on file    Alcohol use Not on file    Drug use: Unknown    Sexual activity: Not on file     Other Topics Concern    Not on file     Social History Narrative    No narrative on file       Subjective         Objective    Physical Exam:   O2 Device: (P) vent    Vitals:  Blood pressure 166/95, pulse 102, temperature (!) 96 1 °F (35 6 °C), resp  rate 13, SpO2 95 %  Results from last 7 days  Lab Units 12/29/17  1800   LETI TEST  Postive Leti Test       Imaging and other studies: I have personally reviewed pertinent reports        O2 Device: (P) vent     Plan    Respiratory Plan: (P) Vent/NIV/HFNC        Resp Comments: (P) ETT changed to #8 0 taped 23 cm @ lip by physician

## 2017-12-30 NOTE — H&P
History and Physical - Critical Care   Lei Merritt 78 y o  female MRN: [id number]  Unit/Bed#: [facility] 515-01 Encounter: [id number]    Reason for Admission / Chief Complaint: unresponsive, PEA arrest at home    History of Present Illness:  Lei Merritt is a 78 y o  female who presents to the hospital following sudden unresponsive episode at home with CPR initiated by her   Her  reports she was in her normal state of health and without any present symptoms of illness, cold, flu or any complaints  They were getting ready to go out to a party when the  her her groaning, came into the kitchen and found her sitting in the chair groaning and unresponsive  He eased her to the ground and started CPR and called EMS  She had ROSC enroute to the hospital but had VTach which was initially with a pulse for which amio bolus was given but then proceed into PEA and CPR was initiated in the ED  She was intubated in the field with a 6 0 tube which was exchanged for an 8 0 after blood was noted coming out of the ET tube and a bronch was completed in the ED which revealed some blood but no sputum or mucus plugging  She had ROSC in the ER, was placed on levo at 10, amio gtt started and vent settings of AC 22 450 100% 12 PEEP  Her CXR shows diffuse patchy densities in the left lung and right upper lobes  She is brought to the ICU and will be worked up for possible MI, PNA, or flu as the leading suspected diagnosis  Past medical history is obtained from the  whom reports no medical problems, a history only of breast cancer 20 years ago which was treated with chemo and radiation and without any further sequela since original treatment course  He denies any present medication use for her or any medication allergies  He reports no sick contacts and no symptoms suggestive of infectious illness such as flu or pneumonia, no complaints of SOB or CP       History obtained from chart review and unobtainable from patient due to mental status  Past Medical History:  No past medical history on file  Past Surgical History:  No past surgical history on file  Past Family History:  No family history on file  Social History:  History   Smoking Status    Not on file   Smokeless Tobacco    Not on file     History   Alcohol use Not on file     History   Drug use: Unknown     Marital Status: /Civil Union  Exercise History: pt was able to ambulate up flights of stairs and for prolonged distances without any difficulty according to her      Medications:  Current Facility-Administered Medications   Medication Dose Route Frequency    amiodarone (CORDARONE) 900 mg in dextrose 5 % 500 mL infusion  1 mg/min Intravenous Continuous    chlorhexidine (PERIDEX) 0 12 % oral rinse 15 mL  15 mL Swish & Spit Q12H [address]    famotidine (PEPCID) tablet 20 mg  20 mg Oral BID    Or    famotidine (PEPCID) injection 20 mg  20 mg Intravenous BID    multi-electrolyte (ISOLYTE-S PH 7 4 equivalent) IV solution  125 mL/hr Intravenous Continuous    norepinephrine (LEVOPHED) 4 mg (STANDARD CONCENTRATION) IV in sodium chloride 0 9% 250 mL  1-30 mcg/min Intravenous Titrated     Home medications:  Prior to Admission medications    Not on File     Allergies:  No Known Allergies    ROS:   Review of Systems   Reason unable to perform ROS: unable to complete secondary to intubated - per pt's , all systems negative though        Vitals:  Vitals:    17 1850 17 1850 17 1854 17 1918   BP:  166/95  155/90   Pulse: 102  102 100   Resp: 16  13 (!) 29   Temp: (!) 96 1 °F (35 6 °C)  (!) 96 1 °F (35 6 °C)    SpO2: 95%  95% 98%   Weight:    82 1 kg (181 lb)   Height:    5' 7" (1 702 m)     Temperature:   Temp (24hrs), Av 4 °F (35 8 °C), Min:96 1 °F (35 6 °C), Max:96 8 °F (36 °C)    Current Temperature: (!) 96 1 °F (35 6 °C)    Weights:   IBW: 61 6 kg  Body mass index is 28 35 kg/m²      Hemodynamic Monitoring:  N/A Non-Invasive/Invasive Ventilation Settings:  Respiratory    Lab Data (Last 4 hours)    None         O2/Vent Data (Last 4 hours)      12/29 1750           Vent Mode AC/VC       Resp Rate (BPM) (BPM) 22       Vt (mL) (mL) 450       FIO2 (%) (%) 100       PEEP (cmH2O) (cmH2O) 12       MV 14 9                 No results found for: PHART, APA1ECA, PO2ART, OPN5LCM, Y4YHNEBX, BEART, SOURCE  SpO2: SpO2: 98 %     Physical Exam:  Physical Exam   Constitutional: She appears well-developed and well-nourished  HENT:   Head: Normocephalic and atraumatic  Eyes: Conjunctivae and EOM are normal  Pupils are equal, round, and reactive to light  Right eye exhibits no discharge  Left eye exhibits no discharge  3 mm brisk PAT   Neck: Normal range of motion  No tracheal deviation present  Cardiovascular: Normal rate, regular rhythm, normal heart sounds and intact distal pulses  Pulmonary/Chest: Breath sounds normal  No stridor  She has no wheezes  She has no rales  Abdominal: Soft  She exhibits no distension  Musculoskeletal: Normal range of motion  She exhibits no edema or deformity  Neurological:   GCS 6T (1-1T-4) on arrival to ICU   Skin: Skin is warm and dry  No rash noted  She is not diaphoretic  No erythema  There is pallor         Labs:    Results from last 7 days  Lab Units 12/29/17  1800 12/29/17 1742 12/29/17  1740   WBC Thousand/uL  --  5 42  --    HEMOGLOBIN g/dL  --  13 2  --    I STAT HEMOGLOBIN g/dl 10 2*  --  13 6  13 3   HEMATOCRIT %  --  41 1  --    PLATELETS Thousands/uL  --  145*  --    MONO PCT MAN %  --  2*  --       Results from last 7 days  Lab Units 12/29/17  1800 12/29/17 1742 12/29/17  1740   SODIUM mmol/L  --  140  --    POTASSIUM mmol/L  --  3 1*  --    CHLORIDE mmol/L  --  103  --    CO2 mmol/L  --  20*  --    BUN mg/dL  --  21  --    CREATININE mg/dL  --  1 20  --    CALCIUM mg/dL  --  8 7  --    TOTAL PROTEIN g/dL  --  6 3*  --    BILIRUBIN TOTAL mg/dL  --  0 29  --    ALK PHOS U/L  --  83  --    ALT U/L  --  300*  --    AST U/L  --  357*  --    GLUCOSE RANDOM mg/dL  --  282*  --    GLUCOSE, ISTAT mg/dl 312*  --  299*  301*                Results from last 7 days  Lab Units 12/29/17  1742   INR  1 33*   PTT seconds 41*         No results found for: TROPONINI    Imaging: CXR I have personally reviewed pertinent reports  and I have personally reviewed pertinent films in PACS  EKG: This was personally reviewed by myself  Micro:  No results found for: Renzo Mora, [facility]    ______________________________________________________________________    Assessment: s/p unresponsive episode with CPR initiated at home, ROSC enroute but VTach and PEA arrest in the ED with another round of CPR, epinephrine and eventual ROSC  Plan:      Neuro:    GCS 6T (1-1T-4) on arrival  Cont to hold sedation/analgesia and monitor neuro exam  Will need head CT once stable  Continuous EEG monitoring       CV:    Presumed cardiac arrest at home, CPR initiated immediately by her  and resumed by EMS, ROSC achieved followed by episode of VTach and PEA in the ED with a second round of CPR and return of ROSC   No previous cardiac history    Trop 0 22 post CPR, Nacho Acron without significant ischemic changes   Amiodarone bolus in ED, gtt at 1 mcg x 24 hours then 0 5     Shock - like cardiogenic secondary to CPR  Not responsive to volume initially - continue volume resuscitation as guided by clinical status and endpoints  Levo at 10, titrate for MAP goal > 65  No leukocytosis, fevers, or infectious symptoms prior to admit therefore unlikely to be septic shock     Lines: [place] placed in ICU, femoral line placed in ED emergently - will plan to replace tonight once stabilized      Pulm:    Acute Respiratory Failure secondary to cardiac arrest    ET placed in field - change out with 8 0 tube in ED - no RSI medications required for change out  Follow ABG and titrate vent   Presently AC 22 450 100% and 12     CXR with diffuse patchy densities/pulmonary edema  No symptoms of PNA or infection prior to this episode, no leukocytosis on admit or fevers  No indication for antibiotics at this time     Pulmonary Hemorrhage     Unknown etiology, possibly secondary to CPR, rule out PE    Bedside ECHO tonight - no RV strain noted    2D ECHO in AM     Hold all AC     GI:    No active issues   Trend CBC to ensure blood from ET tube not GI source  :    BHASKAR     Creat 1 2  1 L IVF given so far  Follow-endpoints  s    Strict I&O     F/E/N:    NPO   OG tube to suction   IVF - isolyte at 125 ml/hr   2 L isolate bolus given initially    Hypokalemia - replete for goal > 4 and trend   Hypomagnesemia - replete, goal > 2 and trend   Metabolic acidosis with mild gap, suspect secondary to arrest/CPR  Bicarb 2 amps given and trend  ID:    No evidence of infectious etiology - will obtain flu and blood cultures to rule out      Heme:    HGB stable - no evidence of anemia - monitor in the setting of pulmonary hemorrhage    DVT Prophylaxis    Hold pharmacologic for now, SCDs     Endo:    NO history of DM    Hyperglycemia on BMP - POCT q 6 hours     Msk/Skin:    No signs of trauma, wounds or injury  Pt did not fall,  eased her to floor   Frequent turns and repositioning, local skin care      Disposition: ICU care    Counseling / Coordination of Care  Total Critical Care time spent 40 minutes excluding procedures, teaching and family updates  ______________________________________________________________________    VTE Pharmacologic Prophylaxis: Pharmacologic VTE Prophylaxis contraindicated due to pulmonary hemmorhage   VTE Mechanical Prophylaxis: sequential compression device    Invasive lines and devices:   Invasive Devices     Central Venous Catheter Line            CVC Central Lines 12/29/17 Triple Left Femoral less than 1 day          Peripheral Intravenous Line            Peripheral IV 12/29/17 Left Antecubital less than 1 day    Peripheral IV 12/29/17 Left Wrist less than 1 day          Drain            Urethral Catheter Temperature probe 16 Fr  less than 1 day          Airway            ETT  8 mm less than 1 day                Code Status: Level 1 - Full Code  POA:    POLST:      Given critical illness, patient length of stay will require greater than two midnights  Portions of the record may have been created with voice recognition software  Occasional wrong word or "sound a like" substitutions may have occurred due to the inherent limitations of voice recognition software  Read the chart carefully and recognize, using context, where substitutions have occurred        DEVAN Luna

## 2017-12-30 NOTE — PLAN OF CARE
DISCHARGE PLANNING     Discharge to home or other facility with appropriate resources Progressing        INFECTION - ADULT     Absence or prevention of progression during hospitalization Progressing     Absence of fever/infection during neutropenic period Progressing        Knowledge Deficit     Patient/family/caregiver demonstrates understanding of disease process, treatment plan, medications, and discharge instructions Progressing        PAIN - ADULT     Verbalizes/displays adequate comfort level or baseline comfort level Progressing        Prexisting or High Potential for Compromised Skin Integrity     Skin integrity is maintained or improved Progressing        SAFETY ADULT     Patient will remain free of falls Progressing     Maintain or return to baseline ADL function Progressing     Maintain or return mobility status to optimal level Progressing

## 2017-12-30 NOTE — PROCEDURES
Central Line Insertion  Date/Time: 12/30/2017 5:05 AM  Performed by: Collin Pennington  Authorized by: Collin Pennington     Patient location:  Bedside  Other Assisting Provider: Yes (comment) (Dr Martin Banerjee)    Consent:     Consent obtained:  Written    Consent given by:  Spouse    Risks discussed:  Arterial puncture, bleeding, incorrect placement, infection, nerve damage and pneumothorax    Alternatives discussed:  No treatment and alternative treatment  Universal protocol:     Procedure explained and questions answered to patient or proxy's satisfaction: yes      Relevant documents present and verified: yes      Test results available and properly labeled: yes      Imaging studies available: yes      Required blood products, implants, devices, and special equipment available: yes      Site/side marked: no      Immediately prior to procedure, a time out was called: yes      Patient identity confirmed:  Arm band and hospital-assigned identification number  Pre-procedure details:     Skin preparation:  ChloraPrep  Indications:     Central line indications: medications requiring central line    Anesthesia (see MAR for exact dosages):      Anesthesia method:  Local infiltration    Local anesthetic:  Lidocaine 1% w/o epi  Procedure details:     Location:  Right internal jugular    Vessel type: vein      Laterality:  Right    Approach: percutaneous technique used      Patient position:  Reverse Trendelenburg    Catheter type:  Triple lumen 16cm    Catheter size:  7 5 Fr    Landmarks identified: yes      Ultrasound guidance: yes      Sterile ultrasound techniques: Sterile gel and sterile probe covers were used      Number of attempts:  1    Successful placement: yes    Post-procedure details:     Post-procedure:  Dressing applied and line sutured    Assessment:  Blood return through all ports, free fluid flow, placement verified by x-ray and no pneumothorax on x-ray    Post-procedure complications: none      Patient tolerance of procedure:   Tolerated well, no immediate complications

## 2017-12-30 NOTE — PROGRESS NOTES
Critical Care Interval Progress Note     Alexey Lopez 78 y o  female MRN: [id number]    Unit/Bed#: [facility] 778-63 Encounter: [id number]    Impression:  Active Problems:    * No active hospital problems  *  Resolved Problems:    * No resolved hospital problems  *      Cardiac arrest initial shockable rhythm and asystole  Acute hypoxic respiratory failure  Encephalopathy concern for anoxic brain injury status post cardiac arrest  Bilateral pulmonary opacities pneumonia versus aspiration versus pulmonary contusions  Transaminitis-likely shock liver  Hyperglycemia  Coagulopathy  Hemoptysis-unclear etiology    Plan:  Maintain patient on ventilator, not appropriate for spontaneous breathing trial   Start empiric antibiotics for possible pneumonia  Continue hemodynamic support with norepinephrine infusion  Titrate for mean arterial pressure of greater than 65  Recheck laboratory data in monitor endpoints resuscitation  Obtain echocardiogram   Repeat EKG and trend troponin  Transaminitis likely secondary to shock liver continue to monitor LFTs  Glycemic control protocol for hyperglycemia  Unclear as to why patient had cardiac event, records reviewed in all scripts did not display any history of cardiac disease  Plan to speak with patient's family regarding state of health and past few days  Concerning with patient's cardiac arrest x2 with significant downtime she may have anoxic brain injury  Continue to monitor neurologic exams  Therapeutic temperature monitoring  If patient does not improve neurologically consider EEG  Maintain temperature at 36° with therapeutic temperature management  Amiodarone initiated for shockable rhythm and subsequent runs of V-tach while in the ICU  Patient did undergo bronchoscopy in the emergency department to evaluate pulmonary opacities as well as hemoptysis  No blood clots nor mucus plugging was appreciated on bronchoscopy  Counseling / Coordination of Care:   Total Critical Care time spent 59  minutes excluding procedures, teaching and family updates  ______________________________________________________________________    Chief Complaint:  Cardiac arrest    Recent Events / Nursing Concern:  Patient was reported to be at home and had syncopal event, EMS was called by family  Bystander the CPR was not performed  CPR was initiated 1 firefighters arrived to home  When EMS arrived AED applied and patient did have a shockable rhythm  Patient then had asystole and episodes of PA  Patient arrived to the emergency department return of spontaneous circulation was achieved in the ED  Patient then had subsequent cardiac arrest event  Vitals:   Vitals:    17 1850 17 1850 17 1854 17 1918   BP:  166/95  155/90   Pulse: 102  102 100   Resp: 16  13 (!) 29   Temp: (!) 96 1 °F (35 6 °C)  (!) 96 1 °F (35 6 °C)    SpO2: 95%  95% 98%   Weight:    82 1 kg (181 lb)   Height:    5' 7" (1 702 m)             Temperature: Temp (24hrs), Av 4 °F (35 8 °C), Min:96 1 °F (35 6 °C), Max:96 8 °F (36 °C)  Current: Temperature: (!) 96 1 °F (35 6 °C)    Hemodynamic Monitoring:  N/A       Respiratory:  SpO2: SpO2: 98 %, SpO2 Activity:  , SpO2 Device:         Physical Exam:  Physical Exam   Constitutional: She appears well-developed and well-nourished  HENT:   Head: Normocephalic and atraumatic  Blood from oropharynx, endotracheal tube in place   Eyes: Right eye exhibits no discharge  Left eye exhibits no discharge  No scleral icterus  Pupils 2 mm and reactive   Neck: Neck supple  No tracheal deviation present  Cardiovascular: Regular rhythm  No murmur heard  Left femoral line   Pulmonary/Chest: Breath sounds normal  She has no wheezes  She has no rales  Coarse breath sounds bilaterally   Abdominal: Soft  She exhibits distension  Musculoskeletal: Normal range of motion  She exhibits no edema or deformity  Neurological:   GCS 3 T   Skin: Skin is warm and dry  Nursing note and vitals reviewed  Allergies: No Known Allergies    Medications:   Scheduled Meds:  chlorhexidine 15 mL Swish & Spit Q12H [address]   famotidine 20 mg Oral BID   Or      famotidine 20 mg Intravenous BID     Continuous Infusions:  amiodarone 1 mg/min   multi-electrolyte 125 mL/hr   norepinephrine 1-30 mcg/min     PRN Meds:       Labs:     Results from last 7 days  Lab Units 12/29/17  1800 12/29/17 1742 12/29/17  1740   WBC Thousand/uL  --  5 42  --    HEMOGLOBIN g/dL  --  13 2  --    I STAT HEMOGLOBIN g/dl 10 2*  --  13 6  13 3   HEMATOCRIT %  --  41 1  --    PLATELETS Thousands/uL  --  145*  --    MONO PCT MAN %  --  2*  --        Results from last 7 days  Lab Units 12/29/17  1800 12/29/17 1742 12/29/17 1740   SODIUM mmol/L  --  140  --    POTASSIUM mmol/L  --  3 1*  --    CHLORIDE mmol/L  --  103  --    CO2 mmol/L  --  20*  --    BUN mg/dL  --  21  --    CREATININE mg/dL  --  1 20  --    CALCIUM mg/dL  --  8 7  --    TOTAL PROTEIN g/dL  --  6 3*  --    BILIRUBIN TOTAL mg/dL  --  0 29  --    ALK PHOS U/L  --  83  --    ALT U/L  --  300*  --    AST U/L  --  357*  --    GLUCOSE RANDOM mg/dL  --  282*  --    GLUCOSE, ISTAT mg/dl 312*  --  299*  301*                Results from last 7 days  Lab Units 12/29/17 1742   INR  1 33*   PTT seconds 41*         No results found for: TROPONINI        Diagnostic Imaging / Data: I have personally reviewed pertinent reports  and I have personally reviewed pertinent films in PACS chest x-ray with good positioning of endotracheal tube, opacity of left lung fields as well as right upper lobe  EKG:  Telemetry with sinus tachycardia-repeat EKG pending  Code Status: Level 1 - Full Code    Portions of the record may have been created with voice recognition software  Occasional wrong word or "sound a like" substitutions may have occurred due to the inherent limitations of voice recognition software    Read the chart carefully and recognize, using context, where substitutions have occurred      SIGNATURE: [place],   DATE: December 29, 2017  TIME: 7:42 PM

## 2017-12-30 NOTE — ED PROVIDER NOTES
History  Chief Complaint   Patient presents with    Cardiac Arrest     patient was in kitchen, family saw her fall off her chair and became unresponsive and not breathing; CPR started by fire department, shocked X2; shocked X 3 by EMS; 3 rounds of epi given by EMS; CPR in progess on patient arrival     Patient is a 77 yo woman with no significant pmh who presents for evaluation of cardiac arrest  Per , the patient was asymptomatic earlier today  She was able to walk up stairs and run errands  Immediately PTA he heard her groaning while sitting in a chair watching TV  When he went to check on her she was not breathing  He attempted to check for a pulse and couldn't find one  He called 911 and was instructed in CPR  FD arrived and shocked the patient with AED twice  EMS arrived and found the patient to be in asystole  She was shocked 3 times en route  She was intubated with 6 5 ET tube as well  She was given epinephrine x3 and was given lidocaine 6 minutes prior PTA  A rhythm check on arrival showed wide complex tachycardia with no pulse  Patient was given epinephrine x1 with ROSC  The patient was in narrow vtach at this time  She was given 150mg bolus of amiodarone The patient again arrested following an episode of hypotension of 64/41  She again gained rosc following addition epinephrine and sodium bicarbonate boluses  She was then started on a norepinephrine gtt  Her ET tube was exchanged for an 8 0 tube by critical care attending Dr Jb Esparza  History provided by:  Spouse  History limited by:  Acuity of condition   used: No        None       History reviewed  No pertinent past medical history  History reviewed  No pertinent surgical history  History reviewed  No pertinent family history  I have reviewed and agree with the history as documented      Social History   Substance Use Topics    Smoking status: Never Smoker    Smokeless tobacco: Never Used    Alcohol use Yes Comment: 2 drinks a day        Review of Systems   Unable to perform ROS: Patient unresponsive       Physical Exam  ED Triage Vitals   Temperature Pulse Respirations Blood Pressure SpO2   12/29/17 1820 12/29/17 1735 12/29/17 1735 12/29/17 1734 12/29/17 1742   (!) 96 4 °F (35 8 °C) (!) 139 18 (!) 169/116 90 %      Temp Source Heart Rate Source Patient Position - Orthostatic VS BP Location FiO2 (%)   12/29/17 2100 12/29/17 1735 12/29/17 1738 -- --   Probe Monitor Lying        Pain Score       12/29/17 2100       No Pain           Orthostatic Vital Signs  Vitals:    12/31/17 0500 12/31/17 0600 12/31/17 0800 12/31/17 0900   BP: (!) 133/45 133/65 150/76 153/72   Pulse: 98 98 94 92   Patient Position - Orthostatic VS:           Physical Exam   Constitutional: She is intubated  Backboard in place  79 yo obese woman pulseless with ET tube in place  Patient requiring suction 2/2 bleeding in the oropharynx  Chest compression being performed on presentation  HENT:   Head: Normocephalic and atraumatic  Nose: Nose normal    Patient intubated with blood around the tube  Cardiovascular: An irregular rhythm present  Pulses:       Carotid pulses are 0 on the right side, and 0 on the left side  Femoral pulses are 0 on the right side, and 0 on the left side  Pulseless vtach on presentation   Pulmonary/Chest: Apnea noted  She is intubated  She is in respiratory distress  Musculoskeletal: She exhibits no edema or deformity  Neurological: She is unresponsive  Skin: No rash noted  No erythema  There is pallor  Nursing note and vitals reviewed        ED Medications  Medications   chlorhexidine (PERIDEX) 0 12 % oral rinse 15 mL (15 mL Swish & Spit Given 12/31/17 0913)   multi-electrolyte (ISOLYTE-S PH 7 4 equivalent) IV solution (125 mL/hr Intravenous New Bag 12/30/17 3574)   amiodarone (CORDARONE) 900 mg in dextrose 5 % 500 mL infusion (1 mg/min Intravenous New Bag 12/30/17 1126)   famotidine (PEPCID) tablet 20 mg ( Oral See Alternative 12/31/17 0610)     Or   famotidine (PEPCID) injection 20 mg (20 mg Intravenous Given 12/31/17 0610)   norepinephrine (LEVOPHED) 4 mg (STANDARD CONCENTRATION) IV in sodium chloride 0 9% 250 mL (0 mcg/min Intravenous Hold 12/31/17 0447)   EPINEPHrine (ADRENALIN) 0 1 mg/mL injection **AcuDose Override Pull** (  Not Given 12/29/17 2226)   sodium bicarbonate 8 4 % injection **AcuDose Override Pull** (  Not Given 12/29/17 2226)   busPIRone (BUSPAR) tablet 30 mg (30 mg Oral Not Given 12/31/17 0319)   fentanyl citrate (PF) 100 MCG/2ML 50 mcg (not administered)   heparin (porcine) 25,000 units in 250 mL infusion (premix) (8 Units/kg/hr × 80 kg (Order-Specific) Intravenous Rate/Dose Change 12/31/17 0640)   heparin (porcine) injection 4,000 Units (not administered)   heparin (porcine) injection 2,000 Units (not administered)   insulin lispro (HumaLOG) 100 units/mL subcutaneous injection 1-6 Units (1 Units Subcutaneous Not Given 12/31/17 0545)   pantoprazole (PROTONIX) injection 40 mg (40 mg Intravenous Given 12/31/17 0545)   lidocaine (PF) (XYLOCAINE-MPF) 1 % injection **AcuDose Override Pull** (  Not Given 12/30/17 1543)   hydrALAZINE (APRESOLINE) injection 5 mg (5 mg Intravenous Given 12/31/17 0422)   EPINEPHrine (ADRENALIN) 1 mg/10 mL injection (  Canceled Entry 12/29/17 1830)   amiodarone 150 mg/3 mL injection (150 mg Intravenous Given 12/29/17 1740)   sodium bicarbonate 8 4 % injection (50 mEq Intravenous Given 12/29/17 1738)   calcium chloride 1 g/10 mL injection (1 g Intravenous Given 12/29/17 1738)   sodium chloride 0 9 % bolus (2,000 mL Intravenous Given 12/29/17 1753)   EPINEPHrine (ADRENALIN) 1 mg/10 mL injection (1 mg Intravenous Given 12/29/17 1813)   potassium chloride 40 mEq IVPB (premix) (0 mEq Intravenous Stopped 12/29/17 2005)   norepinephrine (LEVOPHED) 4 mg in sodium chloride 0 9 % 250 mL infusion (0 mcg/min  Stopped 12/29/17 2001)   norepinephrine (LEVOPHED) 1 mg/mL injection **AcuDose Override Pull** (10 mcg  Given 12/29/17 1820)   sodium bicarbonate 50 mEq/50 mL injection (50 mEq Intravenous Given 12/29/17 1813)   EPINEPHrine (ADRENALIN) 1 mg/10 mL injection (1 mg Intravenous Given 12/29/17 1817)   sodium bicarbonate 50 mEq/50 mL injection (50 mEq Intravenous Given 12/29/17 1818)   calcium chloride 1 g/10 mL injection (1 g Intravenous Given 12/29/17 1818)   fentanyl citrate (PF) 100 MCG/2ML (50 mcg Intravenous Given 12/29/17 1835)   fentanyl citrate (PF) 100 MCG/2ML **AcuDose Override Pull** (50 mcg  Given 12/29/17 1835)   magnesium sulfate 2 g/50 mL IVPB (premix) 2 g (0 g Intravenous Stopped 12/29/17 2200)   sodium bicarbonate 8 4 % injection 50 mEq (50 mEq Intravenous Given 12/29/17 2131)   sodium bicarbonate 8 4 % injection 50 mEq (50 mEq Intravenous Given 12/29/17 2131)   potassium chloride 40 mEq IVPB (premix) (0 mEq Intravenous Stopped 12/29/17 2200)   multi-electrolyte (ISOLYTE-S PH 7 4) bolus 500 mL (0 mL Intravenous Stopped 12/30/17 0203)   aspirin tablet 325 mg (325 mg Oral Given 12/30/17 0518)   heparin (porcine) injection 4,000 Units (4,000 Units Intravenous Given 12/30/17 0519)   potassium chloride 40 mEq IVPB (premix) (0 mEq Intravenous Stopped 12/30/17 0845)       Diagnostic Studies  Results Reviewed     Procedure Component Value Units Date/Time    CBC and differential [[id number]]  (Abnormal) Collected:  12/29/17 1742    Lab Status:  Final result Specimen:  Blood from Arm, Left Updated:  12/29/17 1905     WBC 5 42 Thousand/uL      RBC 4 45 Million/uL      Hemoglobin 13 2 g/dL      Hematocrit 41 1 %      MCV 92 fL      MCH 29 7 pg      MCHC 32 1 g/dL      RDW 13 6 %      MPV 9 0 fL      Platelets 100 (L) Thousands/uL      nRBC 1 /100 WBCs     Narrative: This is an appended report  These results have been appended to a previously verified report      Comprehensive metabolic panel [[id number]]  (Abnormal) Collected:  12/29/17 1742    Lab Status:  Final result Specimen: Blood from Arm, Left Updated:  12/29/17 1824     Sodium 140 mmol/L      Potassium 3 1 (L) mmol/L      Chloride 103 mmol/L      CO2 20 (L) mmol/L      Anion Gap 17 (H) mmol/L      BUN 21 mg/dL      Creatinine 1 20 mg/dL      Glucose 282 (H) mg/dL      Calcium 8 7 mg/dL       (H) U/L       (H) U/L      Alkaline Phosphatase 83 U/L      Total Protein 6 3 (L) g/dL      Albumin 3 0 (L) g/dL      Total Bilirubin 0 29 mg/dL      eGFR 47 ml/min/1 73sq m     Narrative:         National Kidney Disease Education Program recommendations are as follows:  GFR calculation is accurate only with a steady state creatinine  Chronic Kidney disease less than 60 ml/min/1 73 sq  meters  Kidney failure less than 15 ml/min/1 73 sq  meters  APTT [[id number]]  (Abnormal) Collected:  12/29/17 1742    Lab Status:  Final result Specimen:  Blood from Arm, Left Updated:  12/29/17 1813     PTT 41 (H) seconds     Narrative:          Therapeutic Heparin Range = 60-90 seconds    Protime-INR [[id number]]  (Abnormal) Collected:  12/29/17 1742    Lab Status:  Final result Specimen:  Blood from Arm, Left Updated:  12/29/17 1813     Protime 16 6 (H) seconds      INR 1 33 (H)    POCT Blood Gas (CG8+) [[id number]]  (Abnormal) Collected:  12/29/17 1800    Lab Status:  Final result Updated:  12/29/17 1807     pH, Art i-STAT 7 102 (LL)     pCO2, Art i-STAT 52 2 (H) mm HG      pO2, ART i-STAT 56 0 (LL) mm HG      BE, i-STAT -13 (L) mmol/L      HCO3, Art i-STAT 16 3 (LL) mmol/L      CO2, i-STAT 18 (L) mmol/L      O2 Sat, i-STAT 76 (L) %      SODIUM, I-STAT 140 mmol/l      Potassium, i-STAT 3 0 (L) mmol/L      Calcium, Ionized i-STAT 1 48 (H) mmol/L      Hct, i-STAT 30 (L) %      Hgb, i-STAT 10 2 (L) g/dl      Glucose, i-STAT 312 (H) mg/dl      POC FIO2 100 L      Specimen Type ARTERIAL     Delivery System [facility]     Respiratory Rate 180     Vent Type 01     Vent Value 450     Ancillary Values PEEPS     Pressure Setting 05     SITE Left Brachial Additional Settings 000     LETI TEST Garnette Calamity Test    POCT troponin [[id number]]  (Abnormal) Collected:  12/29/17 1738    Lab Status:  Final result Updated:  12/29/17 1751     POC Troponin I 0 22 (H) ng/ml      Specimen Type VENOUS    Narrative:         [employer]ott i-Stat handheld analyzer 99% cutoff is > 0 08ng/mL in network Emergency Departments    o cTnI 99% cutoff is useful only when applied to patients in the clinical setting of myocardial ischemia  o cTnI 99% cutoff should be interpreted in the context of clinical history, ECG findings and possibly cardiac imaging to establish correct diagnosis  o cTnI 99% cutoff may be suggestive but clearly not indicative of a coronary event without the clinical setting of myocardial ischemia      POCT Chem 8+ [[id number]]  (Abnormal) Collected:  12/29/17 1740    Lab Status:  Final result Updated:  12/29/17 1745     SODIUM, I-STAT 140 mmol/l      Potassium, i-STAT 2 9 (L) mmol/L      Chloride, istat 103 mmol/L      CO2, i-STAT 21 mmol/L      Anion Gap, Istat 21 (H) mmol/L      Calcium, Ionized i-STAT 1 15 mmol/L      BUN, I-STAT 26 (H) mg/dl      Creatinine, i-STAT 1 0 mg/dl      eGFR 59 ml/min/1 73sq m      Glucose, i-STAT 299 (H) mg/dl      Hct, i-STAT 40 %      Hgb, i-STAT 13 6 g/dl      Specimen Type VENOUS    POCT Blood Gas (CG8+) [[id number]]  (Abnormal) Collected:  12/29/17 1740    Lab Status:  Final result Updated:  12/29/17 1744     ph, Bang ISTAT 7 021 (LL)     pCO2, Bang i-STAT 73 7 (HH) mm HG      pO2, Bang i-STAT 22 0 (L) mm HG      BE, i-STAT -13 (L) mmol/L      HCO3, Bang i-STAT 19 1 (L) mmol/L      CO2, i-STAT 21 mmol/L      O2 Sat, i-STAT 19 (L) %      SODIUM, I-STAT 141 mmol/l      Potassium, i-STAT 2 9 (L) mmol/L      Calcium, Ionized i-STAT 1 17 mmol/L      Hct, i-STAT 39 %      Hgb, i-STAT 13 3 g/dl      Glucose, i-STAT 301 (H) mg/dl      Specimen Type VENOUS                 XR chest portable   Final Result by Herve Pratt MD (12/30 1129)      Interval placement of a right IJ catheter terminating in the distal brachycephalic/SVC  Otherwise, stable chest              Workstation performed: [id number]         XR chest 1 view portable   Final Result by Cruz Hatch MD (12/30 0311)   Typical repositioning of endotracheal tube  Typical placement of nasogastric tube      Diminished diffuse bilateral pulmonary infiltrates         Workstation performed: [id number]         CT head wo contrast   Final Result by Angel Cummings MD (12/29 7445)         1  No acute intracranial hemorrhage  Hyperdensity in the region of foramen of Neal likely represents choroid plexus  Hyperdensity in the left basal ganglia likely representing calcifications  2  White matter hypodensities are identified in the bilateral corona radiata and centrum semiovale could relate to microangiopathy and/or acute to subacute lacunar infarct/ischemia as clinically suspected  I personally discussed this study with Darol Buerger  on 12/29/2017 10:36 PM          Workstation performed: [id number]         X-ray chest 1 view portable   ED Interpretation by Guanaco Jones DO (12/29 1806)   Opacification of the right upper lobe and entire left lung  ET tube in adequate position above the hillary  Final Result by Yesi Horowitz MD (12/29 1901)      Endotracheal tube projecting 1 5 cm above the hillary  Workstation performed: [id number]               Procedures  Procedures      Phone Consults  ED Phone Contact    ED Course  ED Course                                MDM  Number of Diagnoses or Management Options  Cardiac arrest [facility]): new and requires workup  Diagnosis management comments: 79 yo woman present in cardiac arrest  ROSC established x2 and sustained with levophed gtt via central venous access  Patient escorted to 5th floor critical care unit by myself and ED staff  Etiology of arrest unclear at this time          Amount and/or Complexity of Data Reviewed  Clinical lab tests: reviewed  Tests in the radiology section of CPT®: reviewed  Decide to obtain previous medical records or to obtain history from someone other than the patient: yes  Obtain history from someone other than the patient: yes  Review and summarize past medical records: yes  Discuss the patient with other providers: yes  Independent visualization of images, tracings, or specimens: yes    Risk of Complications, Morbidity, and/or Mortality  Presenting problems: high  Diagnostic procedures: moderate  Management options: moderate    Patient Progress  Patient progress: stable    CritCare Time    Disposition  Final diagnoses:   Cardiac arrest [facility])     Time reflects when diagnosis was documented in both MDM as applicable and the Disposition within this note     Time User Action Codes Description Comment    12/29/2017  6:48 PM Kim Moraes Add [I46 9] Cardiac arrest ([facility] [address] )     12/30/2017  8:08 AM Donavon Almaraz Add [G93 40] Acute encephalopathy     12/30/2017  8:08 AM Marilyn Li Modify [G93 40] Acute encephalopathy       ED Disposition     ED Disposition Condition Comment    Admit  Case was discussed with Critical care and the patient's admission status was agreed to be Admission Status: inpatient status to the service of Dr Jeremie Olivia  Follow-up Information    None       There are no discharge medications for this patient  No discharge procedures on file  ED Provider  Attending physically available and evaluated Madhuri Siddiqui I managed the patient along with the ED Attending      Electronically Signed by         Jb Siegel MD  Resident  12/31/17 4631

## 2017-12-30 NOTE — RESPIRATORY THERAPY NOTE
RT Ventilator Management Note  Sundar Ignacio 78 y o  female MRN: [id number]  Unit/Bed#: [facility] 515-01 Encounter: [id number]      Daily Screen       12/30/2017 0811             Patient safety screen outcome[de-identified] Failed    Not Ready for Weaning due to[de-identified] PEEP > 8cmH2O            Physical Exam:   Assessment Type: Assess only  General Appearance: Sedated  Respiratory Pattern: Assisted  Chest Assessment: Chest expansion symmetrical  Bilateral Breath Sounds: Diminished  Cough: None  Suction: ET Tube      Resp Comments: (P) Pt resting on AC settings  No plan to initiate weaning at this time  Red secretions seem to have slowed  Plan to change vent circuit today

## 2017-12-30 NOTE — RESPIRATORY THERAPY NOTE
RT Ventilator Management Note  Narciso Agosto 78 y o  female MRN: [id number]  Unit/Bed#: [facility] 515-01 Encounter: [id number]      Daily Screen     No data found  Physical Exam:   Assessment Type: Assess only  General Appearance: Unresponsive  Respiratory Pattern: Assisted  Chest Assessment: Chest expansion symmetrical  Bilateral Breath Sounds: Rhonchi  Suction: ET Tube  O2 Device: vent      Resp Comments: pt transported from cat scan to room 458 with no complications    and placed back on ventilator at previous settings

## 2017-12-30 NOTE — PROGRESS NOTES
Dr Emelina Hamlin at bedside to perform bronchoscopy  Procedure performed without complication  Provided support to family  VSS throughout  Dr Jo Ann Lee advised no appearance of active bleed  Friable and older blood at bases  Per Jennifer Cody CCM PA Check PTT prior to reinitiating Heparin gtt  ETT blood resolved  VSS ECHO pending

## 2017-12-30 NOTE — CONSULTS
Consultation - Cardiology   Es Negro 78 y o  female MRN: [id number]  Unit/Bed#: [facility] 515-01 Encounter: [id number]      Assessment:  Principal Problem:    Cardiac arrest  Active Problems:    V-tach    Acute respiratory failure    Metabolic acidosis    Lactic acidosis    Hypokalemia    Hemoptysis    Hypomagnesemia    Transaminitis    Acute encephalopathy    Assessment and plan    #Cardiac arrest with initial shockable rhythm status post multiple shocks, then PEA/asystolic arrest   The RosC time is unclear  Etiology of this cardiac arrest is unclear, it is possible that it could be a primary cardiac event  At this time , clinical examination is indicative of severe anoxic brain injury  Would evaluate further with invasive measures  if any meaningful neurological recovery occurs  -patient currently nonresponsive, has nonpurposeful eye opening  Post cardiac arrest echocardiogram reveals EF of about 30-35%, normal RV size and systolic function   -vital signs stable off pressors  -maintained temperature of 36°    #Nonsustained V-tach:  Multiple episodes of nonsustained V-tach on the monitor overnight  Continue amiodarone drip  #Diffuse bilateral pulmonary infiltrates  --->Hemoptysis:  Pulmonary contusion from prolonged CPR? :  Patient had bronchoscopy in the ER to evaluate pulmonary opacities as well as hemoptysis no blood clots or mucus plugging was appreciated    To undergo another bronchoscopy this afternoon    #Transaminases likely secondary to shock liver        History of Present Illness   Physician Requesting Consult: Angel Knutson DO  Reason for Consult / Principal Problem:  Cardiac arrest, V-tach  HPI: Es Negro is a 78y o  year old female with history of breast cancer 20 years ago status post chemo and radiation( not on any medications at home) was brought in after a cardiac arrest  patient appeared to be in a normal state of health yesterday, patient's her  heard her groaning while sitting in a chair watching TV  When he went to check on her she was unresponsive and not breathing  There was no pulse   initiated CPR and called EMS  It appears that the initial rhythm was shockable, received multiple shocks  And then patient went into asystole, ACLS protocol and CPR was continued enroute  On presentation to Ed, had PEA, CPR was continued  And then she had a wide complex tachycardia with pulse, was given amiodarone bolus  She again became bradycardic and hypotensive leading to pulselessness, CPR was re-initiated  Rosc time is unclear  She was then started on norepinephrine drip  As per  was at bedside patient had no known cardiac history, she was not following with a doctor, was not on any medications  EKG reveals normal sinus rhythm with Q-waves in V1               Inpatient consult to Cardiology  Performed by: Beba Negron  Authorized by: Kimberly Quinones           Review of Systems:  Review of Systems      Historical Information   History reviewed  No pertinent past medical history  History reviewed  No pertinent surgical history  History   Alcohol Use    Yes     Comment: 2 drinks a day     History   Drug Use No     History   Smoking Status    Never Smoker   Smokeless Tobacco    Never Used     Family History: non-contributory    Meds/Allergies   PTA meds:   None     No Known Allergies    Objective   Vitals: Blood pressure (!) 120/47, pulse 62, temperature 97 5 °F (36 4 °C), temperature source Probe, resp  rate (!) 24, height 5' 7" (1 702 m), weight 87 5 kg (192 lb 14 4 oz), SpO2 95 %  , Body mass index is 30 21 kg/m² , Orthostatic Blood Pressures    Flowsheet Row Most Recent Value   Blood Pressure   120/47 filed at 12/29/2017 2000   Patient Position - Orthostatic VS  Lying filed at 12/29/2017 1826            Intake/Output Summary (Last 24 hours) at 12/30/17 1218  Last data filed at 12/30/17 1001   Gross per 24 hour   Intake          5490 15 ml   Output 1130 ml   Net          4360 15 ml       Invasive Devices     Central Venous Catheter Line            CVC Central Lines 12/29/17 Triple Left Femoral less than 1 day    CVC Central Lines 12/30/17 Triple 16cm less than 1 day          Peripheral Intravenous Line            Peripheral IV 12/29/17 Left Antecubital less than 1 day    Peripheral IV 12/29/17 Left Wrist less than 1 day          Arterial Line            Arterial Line 12/29/17 Right Radial less than 1 day          Drain            NG/OG/Enteral Tube Orogastric Center mouth less than 1 day    Urethral Catheter Temperature probe 16 Fr  less than 1 day          Airway            ETT  8 mm less than 1 day                      Physical Exam    Gen:  Intubated, nonresponsive to verbal or tactile stimulus  Pinpoint pupils bilaterally  Neck:  No JVD  Heart: regular, normal s1 and s2, no murmur/rub or gallop  Lungs :  Coarse breath sounds heard  No wheezing  Abdomen: soft nontender, normoactive bowel sounds, no organomegaly  Ext: warm and perfused, normal femoral pulses, no edema, clubbing  Skin: warm, no rashes  Neuro: Non responsive       Lab Results:     Lab Results   Component Value Date    TROPONINI 26 80 (H) 12/30/2017    TROPONINI 34 00 (H) 12/30/2017    TROPONINI 27 70 (H) 12/29/2017       Lab Results   Component Value Date    GLUCOSE 199 (H) 12/30/2017    CALCIUM 8 2 (L) 12/30/2017     12/30/2017    K 4 5 12/30/2017    CO2 21 12/30/2017     (H) 12/30/2017    BUN 28 (H) 12/30/2017    CREATININE 1 27 12/30/2017       Lab Results   Component Value Date    WBC 8 36 12/30/2017    HGB 12 1 12/30/2017    HCT 35 9 12/30/2017    MCV 88 12/30/2017     (L) 12/30/2017       No results found for: CHOL  No results found for: HDL  No results found for: LDLCALC  No results found for: TRIG    Lab Results   Component Value Date     (H) 12/30/2017     (H) 12/30/2017         Results from last 7 days  Lab Units 12/30/17  0338   INR  1 30* Imaging: I have personally reviewed pertinent reports

## 2017-12-30 NOTE — PROGRESS NOTES
Critical Care Interval Progress Note     Blinda Setting 78 y o  female MRN: [id number]    Unit/Bed#: [facility] 945-19 Encounter: [id number]    Impression:  Principal Problem:    Cardiac arrest  Active Problems:    V-tach    Acute respiratory failure    Metabolic acidosis    Lactic acidosis    Hypokalemia    Hemoptysis    Hypomagnesemia    Transaminitis    Acute encephalopathy  Resolved Problems:    * No resolved hospital problems  *    Cardiac arrest initial shockable rhythm and asystole  Acute hypoxic respiratory failure  Encephalopathy concern for anoxic brain injury status post cardiac arrest  Bilateral pulmonary opacities pneumonia versus aspiration versus pulmonary contusions  Transaminitis-likely shock liver  Hyperglycemia  Coagulopathy  Hemoptysis-unclear etiology    Plan:    Detailed discussion with patient's family regarding events prior to patient's presentation with cardiac arrest   As per patient's  he reports patient was downstairs watching TV he did hear her making moaning sounds but assumed it was related to the TV show she was watching and not that she was unresponsive  It is unknown for a duration of time that patient had been unresponsive  He reports it may have been up to an hour  Overnight patient has been monitored on continuous EEG  Monitoring is concerning for anoxic brain injury  As per epileptologist no seizure activity identified  Patient's family informed of poor neurologic prognosis  They were also informed that will continue to monitor patient for the next 24-48 hours and discuss patient's plan of care  Continue with ventilatory support  Patient is not appropriate for spontaneous breathing trial   Continue with therapeutic temperature management  Patient did arrive with temperature of 36 degrees, continue to maintain at 36 degrees and till 6:30 p m  this evening  Patient had nonsustained V-tach overnight  Continue with amiodarone infusion  Monitor EKG daily for QTC    Last   Heparin infusion was initiated last night secondary to troponin level of 34  EKG does not show any ST changes consistent with acute MI  This morning patient had episode of hemoptysis and heparin infusion was held  Plan to perform bronchoscopy to evaluate for source of bleeding this afternoon  Formal echocardiogram is pending  Continue to trend troponins  Maintain electrolytes with potassium greater than 4 and magnesium greater than 2  Continue to monitor LFTs slightly increased likely secondary to hypoperfusion  Continue to monitor lactic acid  Lactic acid level may not clear if patient had hepatic injury  Counseling / Coordination of Care: Total Critical Care time spent 44 minutes excluding procedures, teaching and family updates  ______________________________________________________________________    Chief Complaint: cardiac arrest    Recent Events / Nursing Concern: weaned off levophed  Poor neurologic exam     Vitals:   Vitals:    17 0700 17 0800 17 0811 17 0900   BP:       Pulse: 66 62 63 66   Resp: (!) 24 (!) 24     Temp:       TempSrc:       SpO2: 100% 100% 100%    Weight:       Height:         Arterial Line BP: 108/62  Arterial Line MAP (mmHg): 78 mmHg    Temperature: Temp (24hrs), Av 5 °F (35 8 °C), Min:95 7 °F (35 4 °C), Max:97 5 °F (36 4 °C)  Current: Temperature: 97 5 °F (36 4 °C)    Hemodynamic Monitoring:  N/A       Respiratory:  SpO2: SpO2: 95 %, SpO2 Activity: SpO2 Activity: At Rest, SpO2 Device: O2 Device:  (vent)       Physical Exam:  Physical Exam   Constitutional: She appears well-developed and well-nourished  No distress  HENT:   Head: Normocephalic and atraumatic  Endotracheal tube in place, bloody secretions, bloody oral secretions   Eyes: Conjunctivae are normal  Right eye exhibits no discharge  Left eye exhibits no discharge  No scleral icterus  Pinpoint pupils   Neck: Neck supple  No JVD present  No tracheal deviation present  Cardiovascular: Normal rate and regular rhythm  No murmur heard  Pulmonary/Chest: Breath sounds normal  She has no wheezes  She has no rales  Abdominal: Soft  Bowel sounds are normal  She exhibits no distension  There is no tenderness  Genitourinary:   Genitourinary Comments: Urinary catheter in place   Musculoskeletal: Normal range of motion  She exhibits no edema or deformity  Neurological:   Patient occasionally opens eyes does not appear to be purposeful in response to voice or tactile stimulation  No withdrawal x4 extremities  Pupils are pinpoint bilaterally  No corneal reflexes appreciated  Patient does have a weak cough/gag reflex  Patient does have spontaneous respirations above the set ventilatory rate  Skin: Skin is warm and dry  Psychiatric:   Unresponsive   Nursing note and vitals reviewed          Allergies: No Known Allergies    Medications:   Scheduled Meds:  busPIRone 30 mg Oral Q8H   chlorhexidine 15 mL Swish & Spit Q12H [address]   EPINEPHrine      famotidine 20 mg Oral BID AC   Or      famotidine 20 mg Intravenous BID AC   insulin lispro 1-6 Units Subcutaneous Q6H [address]   pantoprazole 40 mg Intravenous Early Morning   potassium chloride 40 mEq Intravenous Once   sodium bicarbonate        Continuous Infusions:  amiodarone 1 mg/min Last Rate: 1 mg/min (12/29/17 2030)   heparin (porcine) 3-20 Units/kg/hr (Order-Specific) Last Rate: Stopped (12/30/17 0845)   multi-electrolyte 125 mL/hr Last Rate: 125 mL/hr (12/30/17 0535)   norepinephrine 1-30 mcg/min Last Rate: Stopped (12/30/17 0532)     PRN Meds:    fentanyl citrate (PF) 50 mcg Q2H PRN   heparin (porcine) 2,000 Units PRN   heparin (porcine) 4,000 Units PRN       Labs:     Results from last 7 days  Lab Units 12/30/17  0511 12/30/17  0338 12/29/17  2311 12/29/17  1947   WBC Thousand/uL 8 36 7 62 6 86 13 62*   HEMOGLOBIN g/dL 12 1 12 4 12 7 13 0   HEMATOCRIT % 35 9 35 6 37 2 39 1   PLATELETS Thousands/uL 145* 147* 166 177   NEUTROS PCT % 88*  --  84* 80*   MONOS PCT % 5  --  3* 1*       Results from last 7 days  Lab Units 12/30/17  0511 12/30/17 0338 12/29/17 2311 12/29/17 1947 12/29/17  1742   SODIUM mmol/L 145  --  148* 144  --  140   POTASSIUM mmol/L 3 6 3 6 4 0 3 6  --  3 1*   CHLORIDE mmol/L 110*  --  111* 108  --  103   CO2 mmol/L 24  --  25 22  --  20*   BUN mg/dL 27*  --  26* 23  --  21   CREATININE mg/dL 1 28  --  1 23 1 20  --  1 20   CALCIUM mg/dL 8 7  --  8 9 9 7  --  8 7   TOTAL PROTEIN g/dL 5 0*  --   --  5 6*  --  6 3*   BILIRUBIN TOTAL mg/dL 0 61  --   --  0 41  --  0 29   ALK PHOS U/L 72  --   --  99  --  83   ALT U/L 325*  --   --  372*  --  300*   AST U/L 791*  --   --  523*  --  357*   GLUCOSE RANDOM mg/dL 183*  --  174* 234*  --  282*   GLUCOSE, ISTAT   --   --   --   --   < >  --    < > = values in this interval not displayed  Results from last 7 days  Lab Units 12/30/17 0511 12/29/17 2311 12/29/17 1947   MAGNESIUM mg/dL 2 4 2 7* 2 2       Results from last 7 days  Lab Units 12/30/17 0511 12/29/17 2311   PHOSPHORUS mg/dL 2 6 4 1        Results from last 7 days  Lab Units 12/30/17 0338 12/29/17  1742   INR  1 30* 1 33*   PTT seconds 28 41*       Results from last 7 days  Lab Units 12/30/17  0821 12/30/17 0338 12/29/17 2311 12/29/17 1947   LACTIC ACID mmol/L 6 4* 5 4* 6 2* 8 4*       0  Lab Value Date/Time   TROPONINI 26 80 (H) 12/30/2017 0821   TROPONINI 34 00 (H) 12/30/2017 0222   TROPONINI 27 70 (H) 12/29/2017 2311   TROPONINI 9 15 (H) 12/29/2017 1947       Results from last 7 days  Lab Units 12/30/17  0539 12/29/17  2312 12/29/17 2017   PH ART  7 410 7 348* 7 212*   PCO2 ART mm Hg 34 9* 40 1 46 3*   PO2 ART mm Hg 94 5 184 0* 70 3*   HCO3 ART mmol/L 21 6* 21 5* 18 2*   BASE EXC ART mmol/L -2 4 -3 8 -9 5   ABG SOURCE  Line, Arterial Line, Arterial Line, Arterial       Diagnostic Imaging / Data: I have personally reviewed pertinent reports  and I have personally reviewed pertinent films in PACS   CT head1   No acute intracranial hemorrhage  Hyperdensity in the region of foramen of Neal likely represents choroid plexus  Hyperdensity in the left basal ganglia likely representing calcifications  2  White matter hypodensities are identified in the bilateral corona radiata and centrum semiovale could relate to microangiopathy and/or acute to subacute lacunar infarct/ischemia as clinically suspected  CXR bilateral pulmonary opacities, ETT good position and cvl good position  EKG: sinsu sanjay with qtc 515 inverted t III and avf  Code Status: Level 1 - Full Code    Portions of the record may have been created with voice recognition software  Occasional wrong word or "sound a like" substitutions may have occurred due to the inherent limitations of voice recognition software  Read the chart carefully and recognize, using context, where substitutions have occurred      SIGNATURE: Cely Dunlap DO  DATE: December 30, 2017  TIME: 9:47 AM

## 2017-12-30 NOTE — PROGRESS NOTES
Family updated throughout the evening of her status  Concerns for Anoxic Brain Injury and unknown etiology so far were addressed  We will continue with Full Code  Better prognostics will be apparent in 72 hrs  Family meeting time 20 mins  Pastoral Care present

## 2017-12-30 NOTE — PROGRESS NOTES
Rounds with Dr Dominique Simental and ANA Will Ar  ETT dark red possibly old bloody drainage  RRT changed out tubing coughed brown red hemoptysis  Hold Heparin gtt 8:45am   Plan is to keep pt at hypothermia protocol target temp of 36 C  Family meeting where Dr Dominique Simental reviewed Neuro exam and prognosis  Comforted family and answered questions  Pt remains on Amio gtt at 1mg /hr  Plan is possible bronch later today  Pt unresponsive VSS ECG performed and put in chart  Emu CONT'D

## 2017-12-30 NOTE — RESPIRATORY THERAPY NOTE
RT Ventilator Management Note  Sundar Ignacio 78 y o  female MRN: [id number]  Unit/Bed#: [facility] 945-98 Encounter: [id number]      Daily Screen       12/30/2017 1155 12/30/2017 1636          Patient safety screen outcome[de-identified] Failed Failed      Not Ready for Weaning due to[de-identified] PEEP > 8cmH2O PEEP > 8cmH2O              Physical Exam:   Assessment Type: (P) Assess only  Respiratory Pattern: Assisted  Chest Assessment: Chest expansion symmetrical  Bilateral Breath Sounds: (P) Diminished  Cough: (P) Weak  Suction: ET Tube      Resp Comments: (P) Pt resting quietly on AC settings  Bronch with Dr Ayah Mcgrath completed  ETT repositioned during bronch

## 2017-12-31 PROBLEM — D72.829 LEUKOCYTOSIS: Status: ACTIVE | Noted: 2017-01-01

## 2017-12-31 NOTE — PROGRESS NOTES
Cardiology Progress Note - Justyn Collado 78 y o  female MRN: [id number]    Unit/Bed#: [facility] 515-01 Encounter: [id number]      Assessment:  1  Cardiac arrest - initial rhythm shockable  2  Cardiomyopathy - EF 40%  3  Acute hypoxic respiratory failure   4  Ventricular tachycardia  5  NSTEMI - Type 1 vs 2  6  Abnormal EEG  7  Hypertension  8  Transaminitis    Plan:  1  Continue supportive care  2  IV heparin, add statin and IV beta-blocker, aspirin if no contraindication   3  IV amiodarone for now, can decrease to 0 5 mg/min - rhythm stable   4  Statin once LFTs stabilize  5  Echo noted   6  EEG noted  7  Critical care addressing neurologic status    Subjective:   Patient seen and examined  No significant events overnight  Objective:     Vitals: Blood pressure 153/72, pulse 92, temperature 98 6 °F (37 °C), temperature source Probe, resp  rate (!) 5, height 5' 7" (1 702 m), weight 88 7 kg (195 lb 8 8 oz), SpO2 100 %  , Body mass index is 30 63 kg/m² , Orthostatic Blood Pressures    Flowsheet Row Most Recent Value   Blood Pressure  153/72 filed at 12/31/2017 0900   Patient Position - Orthostatic VS  Lying filed at 12/29/2017 1826            Intake/Output Summary (Last 24 hours) at 12/31/17 0959  Last data filed at 12/31/17 0800   Gross per 24 hour   Intake          3788 66 ml   Output              835 ml   Net          2953 66 ml         Physical Exam:    GEN: Justyn Collado appears ill, intubated   NECK: supple, no carotid bruits   HEART: regular rhythm, normal S1 and S2, no murmurs, clicks, gallops or rubs   LUNGS: coarse breath sounds bilaterally   ABDOMEN: normal bowel sounds, soft, no tenderness, no distention  EXTREMITIES: peripheral pulses normal; no clubbing, cyanosis, or edema, SCDs in place   NEURO: intubated, not following commands   SKIN: normal without suspicious lesions on exposed skin    Medications:      Current Facility-Administered Medications:     [DISCONTINUED] amiodarone 150 mg in dextrose 5 % 100 mL IV bolus, 150 mg, Intravenous, Once, Stopped at 12/29/17 2005 **AND** amiodarone (CORDARONE) 900 mg in dextrose 5 % 500 mL infusion, 1 mg/min, Intravenous, Continuous, Ariella R Boyce, CRNP, Last Rate: 33 3 mL/hr at 12/30/17 1126, 1 mg/min at 12/30/17 1126    busPIRone (BUSPAR) tablet 30 mg, 30 mg, Oral, Q8H, Ariella R Boyce, CRNP, 30 mg at 12/30/17 0518    chlorhexidine (PERIDEX) 0 12 % oral rinse 15 mL, 15 mL, Swish & Spit, Q12H [facility], Ariella R Boyce, CRNP, 15 mL at 12/31/17 8634    famotidine (PEPCID) tablet 20 mg, 20 mg, Oral, BID AC **OR** famotidine (PEPCID) injection 20 mg, 20 mg, Intravenous, BID AC, Ariella R Boyce, CRNP, 20 mg at 12/31/17 0610    fentanyl citrate (PF) 100 MCG/2ML 50 mcg, 50 mcg, Intravenous, Q2H PRN, Ariella R Boyce, CRNP    heparin (porcine) 25,000 units in 250 mL infusion (premix), 3-20 Units/kg/hr (Order-Specific), Intravenous, Titrated, Ariella R Boyce, CRNP, Last Rate: 6 4 mL/hr at 12/31/17 0640, 8 Units/kg/hr at 12/31/17 0640    heparin (porcine) injection 2,000 Units, 2,000 Units, Intravenous, PRN, Ariella R Boyce, CRNP    heparin (porcine) injection 4,000 Units, 4,000 Units, Intravenous, PRN, Ariella R Boyce, CRNP    hydrALAZINE (APRESOLINE) injection 5 mg, 5 mg, Intravenous, Q6H PRN, Marissa Presley PA-C, 5 mg at 12/31/17 0422    insulin lispro (HumaLOG) 100 units/mL subcutaneous injection 1-6 Units, 1-6 Units, Subcutaneous, Q6H [facility], 1 Units at 12/30/17 1852 **AND** Fingerstick Glucose (POCT), , , Q6H, DEVAN Chen    multi-electrolyte (ISOLYTE-S PH 7 4 equivalent) IV solution, 125 mL/hr, Intravenous, Continuous, OCTAVIO ChenNP, Last Rate: 125 mL/hr at 12/30/17 2258, 125 mL/hr at 12/30/17 2258    norepinephrine (LEVOPHED) 4 mg (STANDARD CONCENTRATION) IV in sodium chloride 0 9% 250 mL, 1-30 mcg/min, Intravenous, Titrated, OCTAVIO ChenNP, Stopped at 12/30/17 0532    pantoprazole (PROTONIX) injection 40 mg, 40 mg, Intravenous, Early Morning, Shelbi Castro PA-C, 40 mg at 12/31/17 0545     Labs & Results:      Results from last 7 days  Lab Units 12/30/17  0821 12/30/17  0222 12/29/17  2311   TROPONIN I ng/mL 26 80* 34 00* 27 70*       Results from last 7 days  Lab Units 12/31/17  0431 12/30/17  0511 12/30/17  0338   WBC Thousand/uL 19 82* 8 36 7 62   HEMOGLOBIN g/dL 11 1* 12 1 12 4   HEMATOCRIT % 32 6* 35 9 35 6   PLATELETS Thousands/uL 149 145* 147*           Results from last 7 days  Lab Units 12/31/17 0431 12/30/17 2139 12/30/17  1629  12/30/17  0511  12/29/17  1947   SODIUM mmol/L 141 144 144  < > 145  < > 144   POTASSIUM mmol/L 4 0 4 0 4 2  < > 3 6  < > 3 6   CHLORIDE mmol/L 106 110* 111*  < > 110*  < > 108   CO2 mmol/L 27 25 23  < > 24  < > 22   BUN mg/dL 31* 29* 29*  < > 27*  < > 23   CREATININE mg/dL 1 14 1 15 1 15  < > 1 28  < > 1 20   CALCIUM mg/dL 8 0* 8 3 8 1*  < > 8 7  < > 9 7   TOTAL PROTEIN g/dL 5 6*  --   --   --  5 0*  --  5 6*   BILIRUBIN TOTAL mg/dL 0 66  --   --   --  0 61  --  0 41   ALK PHOS U/L 71  --   --   --  72  --  99   ALT U/L 236*  --   --   --  325*  --  372*   AST U/L 329*  --   --   --  791*  --  523*   GLUCOSE RANDOM mg/dL 148* 155* 168*  < > 183*  < > 234*   < > = values in this interval not displayed  Results from last 7 days  Lab Units 12/31/17 0431 12/30/17 2139 12/30/17  1628 12/30/17 0338 12/29/17  1742   INR   --   --   --   --  1 30* 1 33*   PTT seconds 93* 97* 32  < > 28 41*   < > = values in this interval not displayed  Results from last 7 days  Lab Units 12/31/17 0431 12/30/17  1629 12/30/17  1017   MAGNESIUM mg/dL 2 3 2 4 2 4       Counseling / Coordination of Care  Total floor / unit time spent today 25 minutes  Greater than 50% of total time was spent with the patient and / or family counseling and / or coordination of care

## 2017-12-31 NOTE — PROCEDURES
Bronchoscopy  Date/Time: 12/30/2017 3:30 PM  Performed by: Nahum Pitts  Authorized by: Nahum Pitts     Consent:     Consent obtained:  Written    Consent given by:  Spouse    Risks discussed:  Pain, pneumothorax, infection, death, bleeding and adverse reaction to sedation    Alternatives discussed:  No treatment  Universal protocol:     Procedure explained and questions answered to patient or proxy's satisfaction: yes      Relevant documents present and verified: yes      Test results available and properly labeled: yes      Required blood products, implants, devices and special equipment available: yes      Immediately prior to procedure a time out was called: yes      Patient identity confirmed: Anonymous protocol, patient vented/unresponsive  Indications:     Procedure Purpose: diagnostic      Indications: pneumonia/infiltrate    Sedation:     Sedation type:  Continuous (ICU/vent)  Airway:     Airway: Thin blood secretions noted throughout airway  No signs of active bleeding noted  Thin blood aspirated from alveolar level  No concern for DAH  Post-procedure details:     Patient tolerance of procedure: Tolerated well, no immediate complications  Final Diagnosis/Findings:      Old blood noted in airway, no signs of active bleeding

## 2017-12-31 NOTE — RESPIRATORY THERAPY NOTE
RT Ventilator Management Note  Eleni Lundborg 78 y o  female MRN: [id number]  Unit/Bed#: [facility] 645-21 Encounter: [id number]      Daily Screen       12/30/2017 1155 12/30/2017 1636          Patient safety screen outcome[de-identified] Failed Failed      Not Ready for Weaning due to[de-identified] PEEP > 8cmH2O PEEP > 8cmH2O              Physical Exam:   Assessment Type: (P) Assess only  General Appearance: (P) Sedated  Respiratory Pattern: (P) Assisted  Chest Assessment: (P) Chest expansion symmetrical  Bilateral Breath Sounds: (P) Diminished  Cough: Weak  O2 Device: vent      Resp Comments: (P) no changes on vent overnight, pt remains stable on AC settings, will cont to monitor pt overnight

## 2017-12-31 NOTE — PROGRESS NOTES
Progress Note - Critical Care   Neel Luke 78 y o  female MRN: [id number]  Unit/Bed#: [facility] 515-01 Encounter: [id number]    Assessment:   Principal Problem:    Cardiac arrest/ VT with ROSC  Active Problems:    Acute encephalopathy    Acute respiratory failure    Hemoptysis    V-tach/ NSVT    Lactic acidosis    Metabolic acidosis    Transaminitis- improving    Hypomagnesemia    Hypokalemia  Leukocytosis  Resolved Problems:      Plan:   Neuro:   · Continue serial neuro exams  · Continuous video EEG monitoring discussed with Dr Everrett Cooks  · Hold any sedating or CNS depressing medications  · Maintain normothermia and euglycemia  · D/c buspirone  CV:   · Continue amiodarone infusion  · Cardiology following, consider starting ASA and BBlocker  · No indication for cardiac catheterization at this time secondary to severe encephalopathy  Lung:   · Continue full vent support, patient not a candidate for SBT secondary to encephalopathy  · Attempt to wean peep today  GI:   · Continue Protonix, d/c famotidine  FEN:   · Continue NPO  :  · Strict I&O  · BMP in the AM  ID:   · Monitor off antibiotics at this time  · Trend fever and WBC trend  Heme:   · Heparin gtt per cardiology  Endo:   · Continue SSI  Msk/Skin:  · Turn position every 2 hours, offload pressure points  Disposition:   · Continuing critical care unit  · Family meeting with goals of care discussion for today    ______________________________________________________________________  Chief Complaint:   Patient unable to provide secondary to encephalopathy and intubation    HPI/24hr events:   Patient no longer has gag reflex  ______________________________________________________________________  Temperature:   Temp (24hrs), Av 9 °F (36 6 °C), Min:96 4 °F (35 8 °C), Max:98 8 °F (37 1 °C)    Current Temperature: 98 6 °F (37 °C)    Vitals:    17 0600 17 0750 17 0800 17 0900   BP: 133/65  150/76 153/72   Pulse: 98  94 92   Resp:   (!) 7 (!) 5 Temp:   98 6 °F (37 °C)    TempSrc:   Probe    SpO2:  100% 100% 100%   Weight:       Height:         Arterial Line BP: 140/78  Arterial Line MAP (mmHg): 104 mmHg     Weights:   IBW: 61 6 kg    Body mass index is 30 63 kg/m²  Weight (last 2 days)     Date/Time   Weight    12/31/17 0552  88 7 (195 55)    12/30/17 0500  87 5 (192 9)    12/29/17 1918  82 1 (181)            Height: 5' 7" (170 2 cm)      Respiratory    Lab Data (Last 4 hours)      12/31 1007            pH, Arterial       (!)7 481           pCO2, Arterial       (!)32 3           pO2, Arterial       (!)166 3           HCO3, Arterial       23 6           Base Excess, Arterial       0 6                O2/Vent Data           Most Recent         Vent Mode   AC/VC      Resp Rate (BPM) (BPM)   18      Vt (mL) (mL)   400      FIO2 (%) (%)   50      PEEP (cmH2O) (cmH2O)   12      Patient safety screen outcome:   Failed      MV   11 1                  Lab Results   Component Value Date    PHART 7 481 (H) 12/31/2017    QRW8OHU 32 3 (L) 12/31/2017    PO2ART 166 3 (H) 12/31/2017    EXA9IIH 23 6 12/31/2017    BEART 0 6 12/31/2017    SOURCE Line, Arterial 12/31/2017     SpO2: SpO2: 100 %    ______________________________________________________________________  Physical Exam:  Lewis Agitation Sedation Scale (RASS): Unarousable  Physical Exam   Constitutional: She appears well-developed and well-nourished  No distress  She is intubated  HENT:   Mouth/Throat: Oropharynx is clear and moist and mucous membranes are normal    Eyes: Pupils are equal, round, and reactive to light  Neck: Neck supple  No JVD present  Cardiovascular: Normal rate, regular rhythm and normal heart sounds  Pulses:       Radial pulses are 1+ on the right side, and 1+ on the left side  Dorsalis pedis pulses are 1+ on the right side, and 1+ on the left side  Pulmonary/Chest: No accessory muscle usage  She is intubated  No respiratory distress   She has decreased breath sounds in the right lower field and the left lower field  She has rhonchi  Abdominal: Soft  Bowel sounds are normal  There is no tenderness  Genitourinary:   Genitourinary Comments: Johnson to gravity   Neurological: She is unresponsive  GCS eye subscore is 1  GCS verbal subscore is 1  GCS motor subscore is 1  No gag or corneal reflexes  Weak response to deep carinal stimulation  Spontaneous respirations above the vent   Skin: Skin is warm and dry  She is not diaphoretic  No cyanosis   Nails show no clubbing      ______________________________________________________________________  Intake and Outputs:  I/O       12/29 0701 - 12/30 0700 12/30 0701 - 12/31 0700 12/31 0701 - 01/01 0700    I V  (mL/kg) 2350 7 (26 9) 3903 3 (44) 344 3 (3 9)    NG/GT 0 0 0    IV Piggyback 2350 130     Total Intake(mL/kg) 4700 7 (53 7) 4033 3 (45 5) 344 3 (3 9)    Urine (mL/kg/hr) 1010 835 (0 4) 60 (0 2)    Emesis/NG output 0 0 (0) 0 (0)    Stool 0 0 (0)     Total Output 1010 835 60    Net +3690 7 +3198 3 +284 3           Unmeasured Stool Occurrence 1 x          UOP: 30/hour   Nutrition:        Diet Orders            Start     Ordered    12/29/17 1915  Diet NPO  Diet effective now     Question:  Diet Type  Answer:  NPO    12/29/17 1922          Labs:     Results from last 7 days  Lab Units 12/31/17  0431 12/30/17  0511 12/30/17  0338 12/29/17  2311 12/29/17  1947   WBC Thousand/uL 19 82* 8 36 7 62 6 86 13 62*   HEMOGLOBIN g/dL 11 1* 12 1 12 4 12 7 13 0   HEMATOCRIT % 32 6* 35 9 35 6 37 2 39 1   PLATELETS Thousands/uL 149 145* 147* 166 177   NEUTROS PCT %  --  88*  --  84* 80*   MONOS PCT %  --  5  --  3* 1*   MONO PCT MAN % 2*  --   --   --   --       Results from last 7 days  Lab Units 12/31/17  0431 12/30/17  2139 12/30/17  1629  12/30/17  0511  12/29/17  1947   SODIUM mmol/L 141 144 144  < > 145  < > 144   POTASSIUM mmol/L 4 0 4 0 4 2  < > 3 6  < > 3 6   CHLORIDE mmol/L 106 110* 111*  < > 110*  < > 108   CO2 mmol/L 27 25 23  < > 24  < > 22   BUN mg/dL 31* 29* 29*  < > 27*  < > 23   CREATININE mg/dL 1 14 1 15 1 15  < > 1 28  < > 1 20   CALCIUM mg/dL 8 0* 8 3 8 1*  < > 8 7  < > 9 7   ALBUMIN g/dL 2 4*  --   --   --  2 4*  --  2 6*   TOTAL PROTEIN g/dL 5 6*  --   --   --  5 0*  --  5 6*   BILIRUBIN TOTAL mg/dL 0 66  --   --   --  0 61  --  0 41   ALK PHOS U/L 71  --   --   --  72  --  99   ALT U/L 236*  --   --   --  325*  --  372*   AST U/L 329*  --   --   --  791*  --  523*   GLUCOSE RANDOM mg/dL 148* 155* 168*  < > 183*  < > 234*   < > = values in this interval not displayed  Results from last 7 days  Lab Units 12/31/17  0431 12/30/17  1629 12/30/17  1017   MAGNESIUM mg/dL 2 3 2 4 2 4       Results from last 7 days  Lab Units 12/31/17  0431 12/30/17  1017 12/30/17  0511   PHOSPHORUS mg/dL 3 5 3 0 2 6        Results from last 7 days  Lab Units 12/31/17  0431 12/30/17  2139 12/30/17  1628  12/30/17  0338 12/29/17  1742   INR   --   --   --   --  1 30* 1 33*   PTT seconds 93* 97* 32  < > 28 41*   < > = values in this interval not displayed  Results from last 7 days  Lab Units 12/31/17  0431   LACTIC ACID mmol/L 2 7*       0  Lab Value Date/Time   TROPONINI 26 80 (H) 12/30/2017 0821   TROPONINI 34 00 (H) 12/30/2017 0222   TROPONINI 27 70 (H) 12/29/2017 2311   TROPONINI 9 15 (H) 12/29/2017 1947     Imaging:  I have personally reviewed pertinent films in PACS   12/30 ECHO: EF 40% moderate diffuse hypokinesis with regional variations, Grade 1 DD, RV systolic function was reduced  EKG: SR on tele by my read  Micro:  Procedure Component Value - Date/Time   Blood culture [[id number]] Collected: 12/29/17 2405   Lab Status: Preliminary result Specimen: Blood from Arm, Left Updated: 12/31/17 0801    Blood Culture No Growth at 24 hrs     Influenza A/B and RSV by PCR [[id number]] (Normal) Collected: 12/29/17 2119   Lab Status: Final result Specimen: Nasopharyngeal from Nasopharyngeal Swab Updated: 12/30/17 1338    INFLU A PCR None Detected    INFLU B PCR None Detected    RSV PCR None Detected   Blood culture [[id number]] Collected: 12/29/17 1947   Lab Status: Preliminary result Specimen: Blood from Arm, Right Updated: 12/30/17 2301    Blood Culture No Growth at 24 hrs  Allergies: No Known Allergies  Medications:   Scheduled Meds:    busPIRone 30 mg Oral Q8H   chlorhexidine 15 mL Swish & Spit Q12H [address]   famotidine 20 mg Oral BID AC   Or      famotidine 20 mg Intravenous BID AC   insulin lispro 1-6 Units Subcutaneous Q6H [address]   pantoprazole 40 mg Intravenous Early Morning     Continuous Infusions:    amiodarone 1 mg/min Last Rate: 1 mg/min (12/30/17 1126)   heparin (porcine) 3-20 Units/kg/hr (Order-Specific) Last Rate: 8 Units/kg/hr (12/31/17 0640)   multi-electrolyte 125 mL/hr Last Rate: 125 mL/hr (12/30/17 0048)   norepinephrine 1-30 mcg/min Last Rate: Stopped (12/30/17 0532)     PRN Meds:    fentanyl citrate (PF) 50 mcg Q2H PRN   heparin (porcine) 2,000 Units PRN   heparin (porcine) 4,000 Units PRN   hydrALAZINE 5 mg Q6H PRN     VTE Pharmacologic Prophylaxis:   Pharmacologic: Heparin Drip  Mechanical VTE Prophylaxis in Place: Yes    Invasive lines and devices: Invasive Devices     Central Venous Catheter Line            CVC Central Lines 12/30/17 Triple 16cm 1 day          Peripheral Intravenous Line            Peripheral IV 12/29/17 Left Antecubital 1 day          Arterial Line            Arterial Line 12/29/17 Right Radial 1 day          Drain            NG/OG/Enteral Tube Orogastric Center mouth 1 day    Urethral Catheter Temperature probe 16 Fr  1 day          Airway            ETT  8 mm 1 day                   Counseling / Coordination of Care      Code Status: Level 1 - Full Code    Portions of the record may have been created with voice recognition software  Occasional wrong word or "sound a like" substitutions may have occurred due to the inherent limitations of voice recognition software    Read the chart carefully and recognize, using context, where substitutions have occurred      DEVAN Christianson

## 2017-12-31 NOTE — CASE MANAGEMENT
Initial Clinical Review    Admission: Date/Time/Statement: 12/29/17 @ 1847     Orders Placed This Encounter   Procedures    Inpatient Admission (expected length of stay for this patient is greater than two midnights)     Standing Status:   Standing     Number of Occurrences:   1     Order Specific Question:   Admitting Physician     Answer:   Jung Penaloza [[id number]]     Order Specific Question:   Level of Care     Answer:   Critical Care [15]     Order Specific Question:   Estimated length of stay     Answer:   More than 2 Midnights     Order Specific Question:   Certification     Answer:   I certify that inpatient services are medically necessary for this patient for a duration of greater than two midnights  See H&P and MD Progress Notes for additional information about the patient's course of treatment  ED: Date/Time/Mode of Arrival:   ED Arrival Information     Expected Arrival Acuity Means of Arrival Escorted By Service Admission Type    - 12/29/2017 17:29 Immediate Ambulance [facility] Critical Care/ICU -    Arrival Complaint    -          Chief Complaint:   Chief Complaint   Patient presents with    Cardiac Arrest     patient was in kitchen, family saw her fall off her chair and became unresponsive and not breathing; CPR started by fire department, shocked X2; shocked X 3 by EMS; 3 rounds of epi given by EMS; CPR in progess on patient arrival       History of Illness: 78 y o  female who presents to the hospital following sudden unresponsive episode at home with CPR initiated by her   Her  reports she was in her normal state of health and without any present symptoms of illness, cold, flu or any complaints  They were getting ready to go out to a party when the  heard her groaning, came into the kitchen and found her sitting in the chair groaning and unresponsive  He eased her to the ground and started CPR and called EMS   She had ROSC enroute to the hospital but had Chavo Ledesma which was initially with a pulse for which amio bolus was given but then proceed into PEA and CPR was initiated in the ED  She was intubated in the field with a 6 0 tube which was exchanged for an 8 0 after blood was noted coming out of the ET tube and a bronch was completed in the ED which revealed some blood but no sputum or mucus plugging  She had ROSC in the ER, was placed on levo at 10, amio gtt started and vent settings of AC 22 450 100% 12 PEEP  Her CXR shows diffuse patchy densities in the left lung and right upper lobes  She is brought to the ICU and will be worked up for possible MI, PNA, or flu as the leading suspected diagnosis       Past medical history is obtained from the  whom reports no medical problems, a history only of breast cancer 20 years ago which was treated with chemo and radiation and without any further sequela since original treatment course  He denies any present medication use for her or any medication allergies  He reports no sick contacts and no symptoms suggestive of infectious illness such as flu or pneumonia, no complaints of SOB or CP       ED Vital Signs:   ED Triage Vitals   Temperature Pulse Respirations Blood Pressure SpO2   12/29/17 1820 12/29/17 1735 12/29/17 1735 12/29/17 1734 12/29/17 1742   (!) 96 4 °F (35 8 °C) (!) 139 18 (!) 169/116 90 %      Temp Source Heart Rate Source Patient Position - Orthostatic VS BP Location FiO2 (%)   12/29/17 2100 12/29/17 1735 12/29/17 1738 -- --   Probe Monitor Lying        Pain Score       12/29/17 2100       No Pain        Wt Readings from Last 1 Encounters:   12/31/17 88 7 kg (195 lb 8 8 oz)       Vital Signs:  12/30 0701  12/31 0700 12/31 0701  12/31 1706  Most Recent     Temperature (°F) 96 498 8 98 498 6  98 5 (36 9)    Pulse 6298 7494  78    Respirations 835 531  16    Blood Pressure 99/50176/73 122/70153/72  130/82    Arterial Line /62168/72 136/74168/82  152/78    SpO2 (%) 95100 99100  99 Abnormal Labs/Diagnostic Test Results:   Blood Gases   (Last 5)     12/30 1016 12/30 1656 12/30 2139 12/31 0431 12/31 1007             pH, Arterial  7 417     7 463     7 505     7 481     7 481            pCO2, Arterial  29 0     28 3     30 0     32 8     32 3            pO2, Arterial  104 0     119 3     126 8     158  1     166 3            HCO3, Arterial  18 3     19 8     23 1     23 9     23 6                Lab Units 12/29/17  1800 12/29/17  1742 12/29/17  1740   WBC Thousand/uL  --  5 42  --    HEMOGLOBIN g/dL  --  13 2  --    I STAT HEMOGLOBIN g/dl 10 2*  --  13 6  13 3   HEMATOCRIT %  --  41 1  --    PLATELETS Thousands/uL  --  145*  --    MONO PCT MAN %  --  2*  --      Lab Units 12/29/17  1800 12/29/17 1742 12/29/17  1740   SODIUM mmol/L  --  140  --    POTASSIUM mmol/L  --  3 1*  --    CHLORIDE mmol/L  --  103  --    CO2 mmol/L  --  20*  --    BUN mg/dL  --  21  --    CREATININE mg/dL  --  1 20  --    CALCIUM mg/dL  --  8 7  --    TOTAL PROTEIN g/dL  --  6 3*  --    BILIRUBIN TOTAL mg/dL  --  0 29  --    ALK PHOS U/L  --  83  --    ALT U/L  --  300*  --    AST U/L  --  357*  --    GLUCOSE RANDOM mg/dL  --  282*  --    GLUCOSE, ISTAT mg/dl 312*  --  299*  301*               ED Treatment:   Medication Administration from 12/29/2017 1726 to 12/29/2017 1913       Date/Time Order Dose Route Action Action by Comments     12/29/2017 1830 EPINEPHrine (ADRENALIN) 1 mg/10 mL injection    Canceled Entry Zechariah Burch RN Automatically documented as Canceled Entry when linked to one-step medication       12/29/2017 1731 EPINEPHrine (ADRENALIN) 1 mg/10 mL injection 1 mg Intravenous Given Zechariah Burch RN      12/29/2017 1740 amiodarone 150 mg/3 mL injection 150 mg Intravenous Given Zechariah Burch RN      12/29/2017 1738 sodium bicarbonate 8 4 % injection 50 mEq Intravenous Given Peter Clifton DO      12/29/2017 1738 calcium chloride 1 g/10 mL injection 1 g Intravenous Given Zechariah Burch RN 12/29/2017 1753 sodium chloride 0 9 % bolus 2,000 mL Intravenous Given Zechariah Burch RN      12/29/2017 1813 EPINEPHrine (ADRENALIN) 1 mg/10 mL injection 1 mg Intravenous Given Annie Pride RN      12/29/2017 1754 EPINEPHrine (ADRENALIN) 1 mg/10 mL injection 1 mg Intravenous Given Hali Duran RN      12/29/2017 1824 potassium chloride 40 mEq IVPB (premix) 40 mEq Intravenous New Macrina Burch RN infusing through central line     12/29/2017 1820 norepinephrine (LEVOPHED) 4 mg in sodium chloride 0 9 % 250 mL infusion 10 mcg/min Intravenous New Bag Sun Duran RN      12/29/2017 1820 norepinephrine (LEVOPHED) 1 mg/mL injection **AcuDose Override Pull** 10 mcg  Given Zechariah Burch RN      12/29/2017 1813 sodium bicarbonate 50 mEq/50 mL injection 50 mEq Intravenous Given Hali Duran RN      12/29/2017 1817 EPINEPHrine (ADRENALIN) 1 mg/10 mL injection 1 mg Intravenous Given Hali Duran RN      12/29/2017 1818 sodium bicarbonate 50 mEq/50 mL injection 50 mEq Intravenous Given Annie Pride RN      12/29/2017 1818 calcium chloride 1 g/10 mL injection 1 g Intravenous Given Hali Duran RN      12/29/2017 1835 fentanyl citrate (PF) 100 MCG/2ML 50 mcg Intravenous Given Annie Pride RN      12/29/2017 1835 fentanyl citrate (PF) 100 MCG/2ML **AcuDose Override Pull** 50 mcg  Given Zechariah Burch RN           Past Medical/Surgical History:   No Additional Past Medical History       Admitting Diagnosis: Cardiac arrest ([facility] [address] ) [I46 9]    Age/Sex: 78 y o  female    Assessment/Plan:   s/p unresponsive episode with CPR initiated at home, ROSC enroute but [place] and PEA arrest in the ED with another round of CPR, epinephrine and eventual ROSC          Plan:                  Neuro:               GCS 6T (1-1T-4) on arrival  Cont to hold sedation/analgesia and monitor neuro exam  Will need head CT once stable                 Continuous EEG monitoring                             CV: Presumed cardiac arrest at home, CPR initiated immediately by her  and resumed by EMS, ROSC achieved followed by episode of VTach and PEA in the ED with a second round of CPR and return of ROSC              No previous cardiac history                  Trop 0 22 post CPR, [place] without significant ischemic changes              Amiodarone bolus in ED, gtt at 1 mcg x 24 hours then 0 5                 Shock - like cardiogenic secondary to CPR  Not responsive to volume initially - continue volume resuscitation as guided by clinical status and endpoints  Levo at 10, titrate for MAP goal > 65  No leukocytosis, fevers, or infectious symptoms prior to admit therefore unlikely to be septic shock      Lines: Chary placed in ICU, femoral line placed in ED emergently - will plan to replace tonight once stabilized                  Pulm:               Acute Respiratory Failure secondary to cardiac arrest                          ET placed in field - change out with 8 0 tube in ED - no RSI medications required for change out  Follow ABG and titrate vent  Presently AC 22 450 100% and 12                           CXR with diffuse patchy densities/pulmonary edema  No symptoms of PNA or infection prior to this episode, no leukocytosis on admit or fevers  No indication for antibiotics at this time                 Pulmonary Hemorrhage                           Unknown etiology, possibly secondary to CPR, rule out PE                          Bedside ECHO tonight - no RV strain noted                          2D ECHO in AM                           Hold all AC                 GI:               No active issues              Trend CBC to ensure blood from ET tube not GI source                  :               BHASKAR                           Creat 1 2  1 L IVF given so far  Follow-endpoints   s                          Strict I&O                 F/E/N:               NPO              OG tube to suction IVF - isolyte at 125 ml/hr              2 L isolate bolus given initially               Hypokalemia - replete for goal > 4 and trend              Hypomagnesemia - replete, goal > 2 and trend              Metabolic acidosis with mild gap, suspect secondary to arrest/CPR  Bicarb 2 amps given and trend                   ID:               No evidence of infectious etiology - will obtain flu and blood cultures to rule out                  Heme:               HGB stable - no evidence of anemia - monitor in the setting of pulmonary hemorrhage               DVT Prophylaxis                          Hold pharmacologic for now, SCDs                 Endo:               NO history of DM               Hyperglycemia on BMP - POCT q 6 hours                 Msk/Skin:               No signs of trauma, wounds or injury   Pt did not fall,  eased her to floor              Frequent turns and repositioning, local skin care                  Disposition: ICU care       Admission Orders:  Admit to ICU  Telem  VS q 1h  Vent  Johnson cath  Npo  NG/OG tube  A-line R radial  TLC   venodynes b/l le  24h EEG video monitoring      Scheduled Meds:   aspirin 325 mg Per OG Tube Daily   busPIRone 30 mg Oral Q8H   chlorhexidine 15 mL Swish & Spit Q12H [address]   famotidine 20 mg Intravenous BID AC   insulin lispro 1-6 Units Subcutaneous Q6H [address]   metoprolol 5 mg Intravenous Q6H   pantoprazole 40 mg Intravenous Early Morning     Continuous Infusions:   amiodarone 1 mg/min Last Rate: 1 mg/min (12/31/17 1359)   heparin (porcine) 3-20 Units/kg/hr (Order-Specific) Last Rate: 10 Units/kg/hr (12/31/17 1433)   multi-electrolyte 100 mL/hr Last Rate: 100 mL/hr (12/31/17 1157)     PRN Meds: fentanyl citrate (PF)    heparin (porcine)    heparin (porcine)    hydrALAZINE

## 2017-12-31 NOTE — PROCEDURES
Continuous Video EEG Monitoring       Patient Name:  Tiara Kamara  MRN: [id number]   :  1950 File #: [id number]   Age: 78 y o  Encounter #: [id number]   Date Performed: 2017 - 2017  Date EEG Reviewed: 2017    Report date: 2017          Study type: Continuous video EEG    ICD 10 diagnosis: anoxic encephalopathy    Start time: 08 on  End time: 08 on      -------------------------------------------------------------------------------------------------------------------   Patient History:  78year old female presents after cardiac arrest  Per  pt was sitting in kitchen when he heard her groaning  When he went into the kitchen he found her slumped in chair,  began cpr and called EMS  Patient had ROSC enroute to hospital but went into [facility]  Patient reportedly  initially had a pulse and was given an amiodarone bolus but thn proceeded to PEA  While in ICU patientt was noted to have facial twitching  This recording was performed to determine whether episodes of facial twitching are seizures and to determine if patient is having subtle/subclinical electrographic seizures  Medications include:     aspirin 325 mg Per OG Tube Daily   busPIRone 30 mg Oral Q8H   chlorhexidine 15 mL Swish & Spit Q12H [address]   famotidine 20 mg Intravenous BID AC   insulin lispro 1-6 Units Subcutaneous Q6H [address]   metoprolol 5 mg Intravenous Q6H   pantoprazole 40 mg Intravenous Early Morning     The patient is normothermic and is not receiving intravenous sedation   -------------------------------------------------------------------------------------------------------------------   Description of Procedure:  · 32 channel digital recording with electrodes placed according to the International 10-20 system with additional T1/T2 electrodes, EOG, EKG, and simultaneous video  A monitoring technologist supervised the continuous recording   The recording was technically satisfactory  -------------------------------------------------------------------------------------------------------------------   Results:   Background Activity:   Throughout the recording the background activity is continuously diffusely suppressed  Activation Procedures:   Neither hyperventilation nor photic stimulation was performed  Abnormal Findings:  See "Background Activity"  Initially, frequent 1 - 2 second bursts of high amplitude poorly organized diffuse intermixed theta and delta slowing are noted  Starting at [id number], these bursts of EEG activity gradually but progressively diminish in amplitude, such that by the end of the recording, the EEG is essentially flat when viewed at the standard sensitivity of 7 microvolts per mm  No focal abnormalities nor interictal epileptiform discharges were noted  No electrographic seizures occurred during the recording  Other findings: The single lead EKG demonstrated a regular  rhythm  Events:   No push button events occurred during this study  No periods of facial twitching were noted  -------------------------------------------------------------------------------------------------------------------   Interpretation:   Markedly abnormal 24 hour continuous video-EEG recording, due to nearly continuous diffuse suppression of the background activity  Initially, frequent bursts of diffuse high amplitude intermixed theta and delta activity are noted, but after 1710, the electrographic activity gradually progressively diminishes in amplitude, such that by the end of the recording, the EEG is essentially flat when viewed at the standard sensitivity of 7 microvolts per mm  This is consistent with severe diffuse cerebral dysfunction, the dysfunction becoming progressively more severe over the course of the recording  No focal abnormalities nor interictal epileptiform discharges were noted  No electrographic seizures occurred during the recording  Lindsay Marroquin, [address] [facility]  Pager # [phone]

## 2017-12-31 NOTE — PROGRESS NOTES
Patient now no longer breathing over ventilator  Pupil reactions present but diminishing from last assessment

## 2017-12-31 NOTE — PROGRESS NOTES
Critical Care Interval Progress Note     Nessa Green 78 y o  female MRN: [id number]    Unit/Bed#: [facility] 365-58 Encounter: [id number]    Impression:  Principal Problem:    Cardiac arrest  Active Problems:    Acute encephalopathy    Acute respiratory failure    Hemoptysis    V-tach    Leukocytosis    Lactic acidosis    Metabolic acidosis    Transaminitis    Hypomagnesemia    Hypokalemia  Resolved Problems:    * No resolved hospital problems  *    Cardiac arrest initial shockable rhythm and asystole  Acute hypoxic respiratory failure  NSVT  Encephalopathy concern for anoxic brain injury status post cardiac arrest  Bilateral pulmonary opacities pneumonia versus aspiration versus pulmonary contusions  Transaminitis-likely shock liver  Hyperglycemia  Coagulopathy  Hemoptysis-unclear etiology    Plan:  Patient with decline in neurologic exam from yesterday, eeg flat no burst apparent  Patient no longer with eye opening  Bp elevated  Not approriate for sbt  Discussion with family, patient , daughter and son and extended family with Eve Rivera and Elian Geller present  Family wishes to transition to comfort care  GOl to discuss option with family awaiting final care plan pending wish to continue care for possible organ donation  Counseling / Coordination of Care: Total Critical Care time spent 38 minutes excluding procedures, teaching and family updates      ______________________________________________________________________    Chief Complaint: cardiac arrest    Recent Events / Nursing Concern: worsening neurologic exam overnight    Vitals:   Vitals:    17 0600 17 0750 17 0800 17 0900   BP: 133/65  150/76 153/72   Pulse: 98  94 92   Resp:   (!) 7 (!) 5   Temp:   98 6 °F (37 °C)    TempSrc:   Probe    SpO2:  100% 100% 100%   Weight:       Height:         Arterial Line BP: 140/78  Arterial Line MAP (mmHg): 104 mmHg    Temperature: Temp (24hrs), Av 9 °F (36 6 °C), Min:96 4 °F (35 8 °C), Max:98 8 °F (37 1 °C)  Current: Temperature: 98 6 °F (37 °C)    Hemodynamic Monitoring:  CVP:         Respiratory:  SpO2: SpO2: 100 %, SpO2 Activity: SpO2 Activity: At Rest, SpO2 Device: O2 Device:  (vent)       Physical Exam:  Physical Exam   Constitutional: She appears well-developed and well-nourished  HENT:   Head: Normocephalic and atraumatic  ETT 8 in place     Eyes: Conjunctivae are normal  Pupils are equal, round, and reactive to light  Right eye exhibits no discharge  Left eye exhibits no discharge  No scleral icterus  Neck: Normal range of motion  Neck supple  No tracheal deviation present  Cardiovascular: Normal rate and regular rhythm  No murmur heard  Pulmonary/Chest: Breath sounds normal  No respiratory distress  She has no wheezes  She has no rales  Abdominal: Soft  Bowel sounds are normal  She exhibits no distension  There is no tenderness  Genitourinary:   Genitourinary Comments: Urinary catheter in place     Musculoskeletal: Normal range of motion  She exhibits no edema or deformity  Neurological:   gcs 3t, sluggish pupil light response 2mm, no corneal reflex, no cough or gag, no w/d pain x 4 extrem, spont resp triggering vent   Skin: Skin is warm and dry           Allergies: No Known Allergies    Medications:   Scheduled Meds:  aspirin 325 mg Per OG Tube Daily   busPIRone 30 mg Oral Q8H   chlorhexidine 15 mL Swish & Spit Q12H [address]   famotidine 20 mg Intravenous BID AC   insulin lispro 1-6 Units Subcutaneous Q6H [address]   metoprolol 5 mg Intravenous Q6H   pantoprazole 40 mg Intravenous Early Morning     Continuous Infusions:  amiodarone 1 mg/min Last Rate: 1 mg/min (12/30/17 1126)   heparin (porcine) 3-20 Units/kg/hr (Order-Specific) Last Rate: 8 Units/kg/hr (12/31/17 0340)   multi-electrolyte 100 mL/hr Last Rate: 125 mL/hr (12/30/17 6532)     PRN Meds:    fentanyl citrate (PF) 50 mcg Q2H PRN   heparin (porcine) 2,000 Units PRN   heparin (porcine) 4,000 Units PRN   hydrALAZINE 5 mg Q6H PRN       Labs:     Results from last 7 days  Lab Units 12/31/17  0431 12/30/17  0511 12/30/17  0338 12/29/17  2311 12/29/17  1947   WBC Thousand/uL 19 82* 8 36 7 62 6 86 13 62*   HEMOGLOBIN g/dL 11 1* 12 1 12 4 12 7 13 0   HEMATOCRIT % 32 6* 35 9 35 6 37 2 39 1   PLATELETS Thousands/uL 149 145* 147* 166 177   NEUTROS PCT %  --  88*  --  84* 80*   MONOS PCT %  --  5  --  3* 1*   MONO PCT MAN % 2*  --   --   --   --        Results from last 7 days  Lab Units 12/31/17 0431 12/30/17  2139 12/30/17  1629  12/30/17  0511  12/29/17  1947   SODIUM mmol/L 141 144 144  < > 145  < > 144   POTASSIUM mmol/L 4 0 4 0 4 2  < > 3 6  < > 3 6   CHLORIDE mmol/L 106 110* 111*  < > 110*  < > 108   CO2 mmol/L 27 25 23  < > 24  < > 22   BUN mg/dL 31* 29* 29*  < > 27*  < > 23   CREATININE mg/dL 1 14 1 15 1 15  < > 1 28  < > 1 20   CALCIUM mg/dL 8 0* 8 3 8 1*  < > 8 7  < > 9 7   TOTAL PROTEIN g/dL 5 6*  --   --   --  5 0*  --  5 6*   BILIRUBIN TOTAL mg/dL 0 66  --   --   --  0 61  --  0 41   ALK PHOS U/L 71  --   --   --  72  --  99   ALT U/L 236*  --   --   --  325*  --  372*   AST U/L 329*  --   --   --  791*  --  523*   GLUCOSE RANDOM mg/dL 148* 155* 168*  < > 183*  < > 234*   < > = values in this interval not displayed  Results from last 7 days  Lab Units 12/31/17 0431 12/30/17  1629 12/30/17  1017   MAGNESIUM mg/dL 2 3 2 4 2 4       Results from last 7 days  Lab Units 12/31/17  0431 12/30/17  1017 12/30/17  0511   PHOSPHORUS mg/dL 3 5 3 0 2 6        Results from last 7 days  Lab Units 12/31/17  0431 12/30/17 2139 12/30/17  1628  12/30/17  0338 12/29/17  1742   INR   --   --   --   --  1 30* 1 33*   PTT seconds 93* 97* 32  < > 28 41*   < > = values in this interval not displayed      Results from last 7 days  Lab Units 12/31/17  1007 12/31/17  0431 12/30/17 2139 12/30/17  1629 12/30/17  0821   LACTIC ACID mmol/L 2 3* 2 7* 3 7* 5 6* 6 4*       0  Lab Value Date/Time   TROPONINI 26 80 (H) 12/30/2017 0821   TROPONINI 34 00 (H) 12/30/2017 0222   TROPONINI 27 70 (H) 12/29/2017 2311   TROPONINI 9 15 (H) 12/29/2017 1947       Results from last 7 days  Lab Units 12/31/17  1007 12/31/17  0431 12/30/17  2139  12/30/17  1016 12/30/17  0539 12/29/17  2312 12/29/17 2017   PH ART  7 481* 7 481* 7 505*  < > 7 417 7 410 7 348* 7 212*   PCO2 ART mm Hg 32 3* 32 8* 30 0*  < > 29 0* 34 9* 40 1 46 3*   PO2 ART mm Hg 166 3* 158 1* 126 8  < > 104 0 94 5 184 0* 70 3*   HCO3 ART mmol/L 23 6 23 9 23 1  < > 18 3* 21 6* 21 5* 18 2*   BASE EXC ART mmol/L 0 6 0 9 0 7  < > -5 0 -2 4 -3 8 -9 5   ABG SOURCE  Line, Arterial Line, Arterial Line, Arterial  --  Line, Arterial Line, Arterial Line, Arterial Line, Arterial   < > = values in this interval not displayed  Diagnostic Imaging / Data: I have personally reviewed pertinent reports  and I have personally reviewed pertinent films in PACS  EKG: sinus   Code Status: Level 1 - Full Code    Portions of the record may have been created with voice recognition software  Occasional wrong word or "sound a like" substitutions may have occurred due to the inherent limitations of voice recognition software  Read the chart carefully and recognize, using context, where substitutions have occurred      SIGNATURE: Ronald Rhodes DO  DATE: December 31, 2017  TIME: 11:53 AM

## 2018-01-01 ENCOUNTER — GENERIC CONVERSION - ENCOUNTER (OUTPATIENT)
Dept: OTHER | Facility: OTHER | Age: 68
End: 2018-01-01

## 2018-01-01 ENCOUNTER — HOSPITAL ENCOUNTER (OUTPATIENT)
Dept: TISSUE PROCUREMENT | Facility: HOSPITAL | Age: 68
Setting detail: OUTPATIENT SURGERY
Discharge: HOME | End: 2018-01-06

## 2018-01-01 ENCOUNTER — APPOINTMENT (INPATIENT)
Dept: RADIOLOGY | Facility: HOSPITAL | Age: 68
DRG: 207 | End: 2018-01-01
Payer: MEDICARE

## 2018-01-01 VITALS
HEART RATE: 88 BPM | TEMPERATURE: 101.5 F | SYSTOLIC BLOOD PRESSURE: 133 MMHG | OXYGEN SATURATION: 97 % | BODY MASS INDEX: 31.11 KG/M2 | RESPIRATION RATE: 18 BRPM | WEIGHT: 198.19 LBS | DIASTOLIC BLOOD PRESSURE: 65 MMHG | HEIGHT: 67 IN

## 2018-01-01 PROBLEM — E87.3 RESPIRATORY ALKALOSIS: Status: ACTIVE | Noted: 2018-01-01

## 2018-01-01 PROBLEM — R04.2 HEMOPTYSIS: Status: RESOLVED | Noted: 2017-01-01 | Resolved: 2018-01-01

## 2018-01-01 PROBLEM — E87.2 LACTIC ACIDOSIS: Status: RESOLVED | Noted: 2017-01-01 | Resolved: 2018-01-01

## 2018-01-01 PROBLEM — E87.2 METABOLIC ACIDOSIS: Status: RESOLVED | Noted: 2017-01-01 | Resolved: 2018-01-01

## 2018-01-01 PROBLEM — E66.9 OBESITY (BMI 30.0-34.9): Status: ACTIVE | Noted: 2018-01-01

## 2018-01-01 LAB
ALBUMIN SERPL BCP-MCNC: 2 G/DL (ref 3.5–5)
ALBUMIN SERPL BCP-MCNC: 2 G/DL (ref 3.5–5)
ALBUMIN SERPL BCP-MCNC: 2.1 G/DL (ref 3.5–5)
ALP SERPL-CCNC: 110 U/L (ref 46–116)
ALP SERPL-CCNC: 166 U/L (ref 46–116)
ALP SERPL-CCNC: 77 U/L (ref 46–116)
ALT SERPL W P-5'-P-CCNC: 106 U/L (ref 12–78)
ALT SERPL W P-5'-P-CCNC: 141 U/L (ref 12–78)
ALT SERPL W P-5'-P-CCNC: 80 U/L (ref 12–78)
ANION GAP SERPL CALCULATED.3IONS-SCNC: 8 MMOL/L (ref 4–13)
ANION GAP SERPL CALCULATED.3IONS-SCNC: 9 MMOL/L (ref 4–13)
APTT PPP: 63 SECONDS (ref 23–35)
ARTERIAL PATENCY WRIST A: NO
ARTERIAL PATENCY WRIST A: NO
ARTERIAL PATENCY WRIST A: YES
AST SERPL W P-5'-P-CCNC: 108 U/L (ref 5–45)
AST SERPL W P-5'-P-CCNC: 138 U/L (ref 5–45)
AST SERPL W P-5'-P-CCNC: 203 U/L (ref 5–45)
ATRIAL RATE: 60 BPM
ATRIAL RATE: 67 BPM
ATRIAL RATE: 72 BPM
BACTERIA BLD CULT: NORMAL
BASE EXCESS BLDA CALC-SCNC: -0.2 MMOL/L
BASE EXCESS BLDA CALC-SCNC: -0.6 MMOL/L
BASE EXCESS BLDA CALC-SCNC: 0.5 MMOL/L
BILIRUB SERPL-MCNC: 0.81 MG/DL (ref 0.2–1)
BILIRUB SERPL-MCNC: 1.43 MG/DL (ref 0.2–1)
BILIRUB SERPL-MCNC: 2.37 MG/DL (ref 0.2–1)
BUN SERPL-MCNC: 19 MG/DL (ref 5–25)
BUN SERPL-MCNC: 21 MG/DL (ref 5–25)
BUN SERPL-MCNC: 25 MG/DL (ref 5–25)
BUN SERPL-MCNC: 27 MG/DL (ref 5–25)
CA-I BLD-SCNC: 1.07 MMOL/L (ref 1.12–1.32)
CALCIUM SERPL-MCNC: 7.7 MG/DL (ref 8.3–10.1)
CALCIUM SERPL-MCNC: 7.9 MG/DL (ref 8.3–10.1)
CALCIUM SERPL-MCNC: 8.2 MG/DL (ref 8.3–10.1)
CALCIUM SERPL-MCNC: 8.3 MG/DL (ref 8.3–10.1)
CHLORIDE SERPL-SCNC: 105 MMOL/L (ref 100–108)
CHLORIDE SERPL-SCNC: 107 MMOL/L (ref 100–108)
CHLORIDE SERPL-SCNC: 107 MMOL/L (ref 100–108)
CHLORIDE SERPL-SCNC: 113 MMOL/L (ref 100–108)
CO2 SERPL-SCNC: 24 MMOL/L (ref 21–32)
CO2 SERPL-SCNC: 25 MMOL/L (ref 21–32)
CO2 SERPL-SCNC: 25 MMOL/L (ref 21–32)
CO2 SERPL-SCNC: 26 MMOL/L (ref 21–32)
CREAT SERPL-MCNC: 0.79 MG/DL (ref 0.6–1.3)
CREAT SERPL-MCNC: 0.79 MG/DL (ref 0.6–1.3)
CREAT SERPL-MCNC: 0.86 MG/DL (ref 0.6–1.3)
CREAT SERPL-MCNC: 0.88 MG/DL (ref 0.6–1.3)
ERYTHROCYTE [DISTWIDTH] IN BLOOD BY AUTOMATED COUNT: 14 % (ref 11.6–15.1)
ERYTHROCYTE [DISTWIDTH] IN BLOOD BY AUTOMATED COUNT: 14.2 % (ref 11.6–15.1)
ERYTHROCYTE [DISTWIDTH] IN BLOOD BY AUTOMATED COUNT: 14.5 % (ref 11.6–15.1)
GFR SERPL CREATININE-BSD FRML MDRD: 69 ML/MIN/1.73SQ M
GFR SERPL CREATININE-BSD FRML MDRD: 71 ML/MIN/1.73SQ M
GFR SERPL CREATININE-BSD FRML MDRD: 78 ML/MIN/1.73SQ M
GFR SERPL CREATININE-BSD FRML MDRD: 78 ML/MIN/1.73SQ M
GLUCOSE SERPL-MCNC: 106 MG/DL (ref 65–140)
GLUCOSE SERPL-MCNC: 107 MG/DL (ref 65–140)
GLUCOSE SERPL-MCNC: 109 MG/DL (ref 65–140)
GLUCOSE SERPL-MCNC: 110 MG/DL (ref 65–140)
GLUCOSE SERPL-MCNC: 111 MG/DL (ref 65–140)
GLUCOSE SERPL-MCNC: 112 MG/DL (ref 65–140)
GLUCOSE SERPL-MCNC: 113 MG/DL (ref 65–140)
GLUCOSE SERPL-MCNC: 115 MG/DL (ref 65–140)
GLUCOSE SERPL-MCNC: 115 MG/DL (ref 65–140)
GLUCOSE SERPL-MCNC: 120 MG/DL (ref 65–140)
GLUCOSE SERPL-MCNC: 121 MG/DL (ref 65–140)
GLUCOSE SERPL-MCNC: 91 MG/DL (ref 65–140)
GLUCOSE SERPL-MCNC: 93 MG/DL (ref 65–140)
GLUCOSE SERPL-MCNC: 93 MG/DL (ref 65–140)
GLUCOSE SERPL-MCNC: 98 MG/DL (ref 65–140)
HCO3 BLDA-SCNC: 21.1 MMOL/L (ref 22–28)
HCO3 BLDA-SCNC: 22.1 MMOL/L (ref 22–28)
HCO3 BLDA-SCNC: 23.1 MMOL/L (ref 22–28)
HCT VFR BLD AUTO: 26.1 % (ref 34.8–46.1)
HCT VFR BLD AUTO: 29.3 % (ref 34.8–46.1)
HCT VFR BLD AUTO: 31 % (ref 34.8–46.1)
HGB BLD-MCNC: 10.2 G/DL (ref 11.5–15.4)
HGB BLD-MCNC: 8.9 G/DL (ref 11.5–15.4)
HGB BLD-MCNC: 9.8 G/DL (ref 11.5–15.4)
HOROWITZ INDEX BLDA+IHG-RTO: 50 MM[HG]
LACTATE SERPL-SCNC: 1.7 MMOL/L (ref 0.5–2)
LACTATE SERPL-SCNC: 1.7 MMOL/L (ref 0.5–2)
MAGNESIUM SERPL-MCNC: 2 MG/DL (ref 1.6–2.6)
MCH RBC QN AUTO: 29.1 PG (ref 26.8–34.3)
MCH RBC QN AUTO: 29.3 PG (ref 26.8–34.3)
MCH RBC QN AUTO: 29.8 PG (ref 26.8–34.3)
MCHC RBC AUTO-ENTMCNC: 32.9 G/DL (ref 31.4–37.4)
MCHC RBC AUTO-ENTMCNC: 33.4 G/DL (ref 31.4–37.4)
MCHC RBC AUTO-ENTMCNC: 34.1 G/DL (ref 31.4–37.4)
MCV RBC AUTO: 87 FL (ref 82–98)
MCV RBC AUTO: 88 FL (ref 82–98)
MCV RBC AUTO: 89 FL (ref 82–98)
O2 CT BLDA-SCNC: 12.3 ML/DL (ref 16–23)
O2 CT BLDA-SCNC: 13 ML/DL (ref 16–23)
O2 CT BLDA-SCNC: 13.4 ML/DL (ref 16–23)
OSMOLALITY UR/SERPL-RTO: 292 MMOL/KG (ref 282–298)
OSMOLALITY UR: 619 MMOL/KG
OXYHGB MFR BLDA: 95.5 % (ref 94–97)
OXYHGB MFR BLDA: 97.1 % (ref 94–97)
OXYHGB MFR BLDA: 97.3 % (ref 94–97)
P AXIS: 16 DEGREES
P AXIS: 21 DEGREES
P AXIS: 24 DEGREES
PCO2 BLDA: 25.5 MM HG (ref 36–44)
PCO2 BLDA: 27.9 MM HG (ref 36–44)
PCO2 BLDA: 29.5 MM HG (ref 36–44)
PEEP RESPIRATORY: 10 CM[H2O]
PH BLDA: 7.51 [PH] (ref 7.35–7.45)
PH BLDA: 7.52 [PH] (ref 7.35–7.45)
PH BLDA: 7.54 [PH] (ref 7.35–7.45)
PHOSPHATE SERPL-MCNC: 2.4 MG/DL (ref 2.3–4.1)
PLATELET # BLD AUTO: 133 THOUSANDS/UL (ref 149–390)
PLATELET # BLD AUTO: 146 THOUSANDS/UL (ref 149–390)
PLATELET # BLD AUTO: 151 THOUSANDS/UL (ref 149–390)
PMV BLD AUTO: 8.7 FL (ref 8.9–12.7)
PMV BLD AUTO: 8.9 FL (ref 8.9–12.7)
PMV BLD AUTO: 8.9 FL (ref 8.9–12.7)
PO2 BLDA: 103.5 MM HG (ref 75–129)
PO2 BLDA: 105.3 MM HG (ref 75–129)
PO2 BLDA: 80.7 MM HG (ref 75–129)
POTASSIUM SERPL-SCNC: 3.3 MMOL/L (ref 3.5–5.3)
POTASSIUM SERPL-SCNC: 3.4 MMOL/L (ref 3.5–5.3)
PR INTERVAL: 171 MS
PR INTERVAL: 188 MS
PR INTERVAL: 188 MS
PROT SERPL-MCNC: 5.1 G/DL (ref 6.4–8.2)
PROT SERPL-MCNC: 5.8 G/DL (ref 6.4–8.2)
PROT SERPL-MCNC: 5.9 G/DL (ref 6.4–8.2)
QRS AXIS: 67 DEGREES
QRS AXIS: 73 DEGREES
QRS AXIS: 76 DEGREES
QRSD INTERVAL: 67 MS
QRSD INTERVAL: 67 MS
QRSD INTERVAL: 75 MS
QT INTERVAL: 413 MS
QT INTERVAL: 479 MS
QT INTERVAL: 483 MS
QTC INTERVAL: 413 MS
QTC INTERVAL: 506 MS
QTC INTERVAL: 529 MS
RBC # BLD AUTO: 2.99 MILLION/UL (ref 3.81–5.12)
RBC # BLD AUTO: 3.34 MILLION/UL (ref 3.81–5.12)
RBC # BLD AUTO: 3.5 MILLION/UL (ref 3.81–5.12)
SODIUM SERPL-SCNC: 138 MMOL/L (ref 136–145)
SODIUM SERPL-SCNC: 140 MMOL/L (ref 136–145)
SODIUM SERPL-SCNC: 141 MMOL/L (ref 136–145)
SODIUM SERPL-SCNC: 146 MMOL/L (ref 136–145)
SPECIMEN SOURCE: ABNORMAL
T WAVE AXIS: -21 DEGREES
T WAVE AXIS: 46 DEGREES
T WAVE AXIS: 67 DEGREES
VENT AC: 12
VENT AC: 18
VENT AC: 18
VENT- AC: AC
VENTRICULAR RATE: 60 BPM
VENTRICULAR RATE: 67 BPM
VENTRICULAR RATE: 72 BPM
VT SETTING VENT: 400 ML
WBC # BLD AUTO: 12.82 THOUSAND/UL (ref 4.31–10.16)
WBC # BLD AUTO: 13.12 THOUSAND/UL (ref 4.31–10.16)
WBC # BLD AUTO: 14.61 THOUSAND/UL (ref 4.31–10.16)

## 2018-01-01 PROCEDURE — 94760 N-INVAS EAR/PLS OXIMETRY 1: CPT

## 2018-01-01 PROCEDURE — 36600 WITHDRAWAL OF ARTERIAL BLOOD: CPT

## 2018-01-01 PROCEDURE — C9113 INJ PANTOPRAZOLE SODIUM, VIA: HCPCS | Performed by: PHYSICIAN ASSISTANT

## 2018-01-01 PROCEDURE — 82805 BLOOD GASES W/O2 SATURATION: CPT | Performed by: PHYSICIAN ASSISTANT

## 2018-01-01 PROCEDURE — 83605 ASSAY OF LACTIC ACID: CPT | Performed by: NURSE PRACTITIONER

## 2018-01-01 PROCEDURE — 82805 BLOOD GASES W/O2 SATURATION: CPT | Performed by: NURSE PRACTITIONER

## 2018-01-01 PROCEDURE — 93005 ELECTROCARDIOGRAM TRACING: CPT

## 2018-01-01 PROCEDURE — 82330 ASSAY OF CALCIUM: CPT | Performed by: NURSE PRACTITIONER

## 2018-01-01 PROCEDURE — 84100 ASSAY OF PHOSPHORUS: CPT | Performed by: NURSE PRACTITIONER

## 2018-01-01 PROCEDURE — 80053 COMPREHEN METABOLIC PANEL: CPT | Performed by: NURSE PRACTITIONER

## 2018-01-01 PROCEDURE — 94003 VENT MGMT INPAT SUBQ DAY: CPT

## 2018-01-01 PROCEDURE — 82948 REAGENT STRIP/BLOOD GLUCOSE: CPT

## 2018-01-01 PROCEDURE — 85730 THROMBOPLASTIN TIME PARTIAL: CPT | Performed by: EMERGENCY MEDICINE

## 2018-01-01 PROCEDURE — 71045 X-RAY EXAM CHEST 1 VIEW: CPT

## 2018-01-01 PROCEDURE — 80048 BASIC METABOLIC PNL TOTAL CA: CPT | Performed by: PHYSICIAN ASSISTANT

## 2018-01-01 PROCEDURE — 85027 COMPLETE CBC AUTOMATED: CPT | Performed by: NURSE PRACTITIONER

## 2018-01-01 PROCEDURE — 83935 ASSAY OF URINE OSMOLALITY: CPT | Performed by: PHYSICIAN ASSISTANT

## 2018-01-01 PROCEDURE — 83930 ASSAY OF BLOOD OSMOLALITY: CPT | Performed by: PHYSICIAN ASSISTANT

## 2018-01-01 PROCEDURE — 94002 VENT MGMT INPAT INIT DAY: CPT

## 2018-01-01 PROCEDURE — 83735 ASSAY OF MAGNESIUM: CPT | Performed by: NURSE PRACTITIONER

## 2018-01-01 RX ORDER — LORAZEPAM 2 MG/ML
4 INJECTION INTRAMUSCULAR ONCE
Status: DISCONTINUED | OUTPATIENT
Start: 2018-01-01 | End: 2018-01-01 | Stop reason: HOSPADM

## 2018-01-01 RX ORDER — FENTANYL CITRATE 50 UG/ML
100 INJECTION, SOLUTION INTRAMUSCULAR; INTRAVENOUS ONCE
Status: COMPLETED | OUTPATIENT
Start: 2018-01-01 | End: 2018-01-01

## 2018-01-01 RX ORDER — MORPHINE SULFATE 10 MG/ML
5 INJECTION, SOLUTION INTRAMUSCULAR; INTRAVENOUS
Status: DISCONTINUED | OUTPATIENT
Start: 2018-01-01 | End: 2018-01-01 | Stop reason: HOSPADM

## 2018-01-01 RX ORDER — MORPHINE SULFATE 10 MG/ML
20 INJECTION, SOLUTION INTRAMUSCULAR; INTRAVENOUS ONCE
Status: COMPLETED | OUTPATIENT
Start: 2018-01-01 | End: 2018-01-01

## 2018-01-01 RX ORDER — LORAZEPAM 2 MG/ML
4 INJECTION INTRAMUSCULAR EVERY 4 HOURS PRN
Status: DISCONTINUED | OUTPATIENT
Start: 2018-01-01 | End: 2018-01-01 | Stop reason: HOSPADM

## 2018-01-01 RX ORDER — ACETAMINOPHEN 160 MG/5ML
650 SUSPENSION, ORAL (FINAL DOSE FORM) ORAL EVERY 6 HOURS PRN
Status: DISCONTINUED | OUTPATIENT
Start: 2018-01-01 | End: 2018-01-01

## 2018-01-01 RX ORDER — HEPARIN SODIUM 5000 [USP'U]/ML
5000 INJECTION, SOLUTION INTRAVENOUS; SUBCUTANEOUS EVERY 8 HOURS SCHEDULED
Status: DISCONTINUED | OUTPATIENT
Start: 2018-01-01 | End: 2018-01-01

## 2018-01-01 RX ORDER — POTASSIUM CHLORIDE 29.8 MG/ML
40 INJECTION INTRAVENOUS ONCE
Status: COMPLETED | OUTPATIENT
Start: 2018-01-01 | End: 2018-01-01

## 2018-01-01 RX ORDER — FAMOTIDINE 20 MG/1
20 TABLET, FILM COATED ORAL 2 TIMES DAILY
Status: DISCONTINUED | OUTPATIENT
Start: 2018-01-01 | End: 2018-01-01

## 2018-01-01 RX ORDER — GLYCOPYRROLATE 0.2 MG/ML
0.2 INJECTION INTRAMUSCULAR; INTRAVENOUS EVERY 6 HOURS PRN
Status: DISCONTINUED | OUTPATIENT
Start: 2018-01-01 | End: 2018-01-01 | Stop reason: HOSPADM

## 2018-01-01 RX ORDER — GLYCOPYRROLATE 0.2 MG/ML
0.1 INJECTION INTRAMUSCULAR; INTRAVENOUS ONCE
Status: DISCONTINUED | OUTPATIENT
Start: 2018-01-01 | End: 2018-01-01 | Stop reason: HOSPADM

## 2018-01-01 RX ADMIN — Medication 1 G: at 23:44

## 2018-01-01 RX ADMIN — ACETAMINOPHEN 650 MG: 160 SUSPENSION ORAL at 19:37

## 2018-01-01 RX ADMIN — SODIUM CHLORIDE, SODIUM GLUCONATE, SODIUM ACETATE, POTASSIUM CHLORIDE, MAGNESIUM CHLORIDE, SODIUM PHOSPHATE, DIBASIC, AND POTASSIUM PHOSPHATE 100 ML/HR: .53; .5; .37; .037; .03; .012; .00082 INJECTION, SOLUTION INTRAVENOUS at 13:53

## 2018-01-01 RX ADMIN — FAMOTIDINE 20 MG: 10 INJECTION, SOLUTION INTRAVENOUS at 06:35

## 2018-01-01 RX ADMIN — CHLORHEXIDINE GLUCONATE 15 ML: 1.2 RINSE ORAL at 21:45

## 2018-01-01 RX ADMIN — ASPIRIN 325 MG: 325 TABLET ORAL at 08:23

## 2018-01-01 RX ADMIN — FENTANYL CITRATE 50 MCG: 50 INJECTION INTRAMUSCULAR; INTRAVENOUS at 00:36

## 2018-01-01 RX ADMIN — HYDRALAZINE HYDROCHLORIDE 5 MG: 20 INJECTION INTRAMUSCULAR; INTRAVENOUS at 02:34

## 2018-01-01 RX ADMIN — CHLORHEXIDINE GLUCONATE 15 ML: 1.2 RINSE ORAL at 21:48

## 2018-01-01 RX ADMIN — HEPARIN SODIUM 5000 UNITS: 5000 INJECTION, SOLUTION INTRAVENOUS; SUBCUTANEOUS at 21:45

## 2018-01-01 RX ADMIN — FAMOTIDINE 20 MG: 10 INJECTION, SOLUTION INTRAVENOUS at 06:21

## 2018-01-01 RX ADMIN — POTASSIUM CHLORIDE 40 MEQ: 400 INJECTION, SOLUTION INTRAVENOUS at 11:43

## 2018-01-01 RX ADMIN — METOPROLOL TARTRATE 5 MG: 1 INJECTION, SOLUTION INTRAVENOUS at 22:32

## 2018-01-01 RX ADMIN — FAMOTIDINE 20 MG: 10 INJECTION, SOLUTION INTRAVENOUS at 16:25

## 2018-01-01 RX ADMIN — HEPARIN SODIUM 5000 UNITS: 5000 INJECTION, SOLUTION INTRAVENOUS; SUBCUTANEOUS at 21:48

## 2018-01-01 RX ADMIN — AMIODARONE HYDROCHLORIDE 1 MG/MIN: 50 INJECTION, SOLUTION INTRAVENOUS at 07:49

## 2018-01-01 RX ADMIN — AMIODARONE HYDROCHLORIDE 1 MG/MIN: 50 INJECTION, SOLUTION INTRAVENOUS at 16:35

## 2018-01-01 RX ADMIN — SODIUM CHLORIDE, SODIUM GLUCONATE, SODIUM ACETATE, POTASSIUM CHLORIDE, MAGNESIUM CHLORIDE, SODIUM PHOSPHATE, DIBASIC, AND POTASSIUM PHOSPHATE 100 ML/HR: .53; .5; .37; .037; .03; .012; .00082 INJECTION, SOLUTION INTRAVENOUS at 00:31

## 2018-01-01 RX ADMIN — PANTOPRAZOLE SODIUM 40 MG: 40 INJECTION, POWDER, FOR SOLUTION INTRAVENOUS at 05:17

## 2018-01-01 RX ADMIN — METOPROLOL TARTRATE 5 MG: 1 INJECTION, SOLUTION INTRAVENOUS at 17:40

## 2018-01-01 RX ADMIN — CHLORHEXIDINE GLUCONATE 15 ML: 1.2 RINSE ORAL at 08:23

## 2018-01-01 RX ADMIN — METOPROLOL TARTRATE 5 MG: 1 INJECTION, SOLUTION INTRAVENOUS at 16:25

## 2018-01-01 RX ADMIN — HEPARIN SODIUM 5000 UNITS: 5000 INJECTION, SOLUTION INTRAVENOUS; SUBCUTANEOUS at 13:54

## 2018-01-01 RX ADMIN — FAMOTIDINE 20 MG: 20 TABLET, FILM COATED ORAL at 08:06

## 2018-01-01 RX ADMIN — FENTANYL CITRATE 100 MCG: 50 INJECTION INTRAMUSCULAR; INTRAVENOUS at 01:09

## 2018-01-01 RX ADMIN — LORAZEPAM 4 MG: 2 INJECTION INTRAMUSCULAR; INTRAVENOUS at 12:41

## 2018-01-01 RX ADMIN — POTASSIUM CHLORIDE 40 MEQ: 400 INJECTION, SOLUTION INTRAVENOUS at 10:47

## 2018-01-01 RX ADMIN — HYDRALAZINE HYDROCHLORIDE 5 MG: 20 INJECTION INTRAMUSCULAR; INTRAVENOUS at 20:36

## 2018-01-01 RX ADMIN — ASPIRIN 325 MG: 325 TABLET ORAL at 08:26

## 2018-01-01 RX ADMIN — HEPARIN SODIUM 5000 UNITS: 5000 INJECTION, SOLUTION INTRAVENOUS; SUBCUTANEOUS at 05:19

## 2018-01-01 RX ADMIN — HYDRALAZINE HYDROCHLORIDE 5 MG: 20 INJECTION INTRAMUSCULAR; INTRAVENOUS at 00:32

## 2018-01-01 RX ADMIN — ASPIRIN 325 MG: 325 TABLET ORAL at 08:06

## 2018-01-01 RX ADMIN — AMIODARONE HYDROCHLORIDE 1 MG/MIN: 50 INJECTION, SOLUTION INTRAVENOUS at 00:31

## 2018-01-01 RX ADMIN — METOPROLOL TARTRATE 5 MG: 1 INJECTION, SOLUTION INTRAVENOUS at 11:42

## 2018-01-01 RX ADMIN — POTASSIUM CHLORIDE 40 MEQ: 400 INJECTION, SOLUTION INTRAVENOUS at 23:40

## 2018-01-01 RX ADMIN — CHLORHEXIDINE GLUCONATE 15 ML: 1.2 RINSE ORAL at 08:26

## 2018-01-01 RX ADMIN — AMIODARONE HYDROCHLORIDE 1 MG/MIN: 50 INJECTION, SOLUTION INTRAVENOUS at 08:37

## 2018-01-01 RX ADMIN — CHLORHEXIDINE GLUCONATE 15 ML: 1.2 RINSE ORAL at 08:07

## 2018-01-01 RX ADMIN — HEPARIN SODIUM 5000 UNITS: 5000 INJECTION, SOLUTION INTRAVENOUS; SUBCUTANEOUS at 05:17

## 2018-01-01 RX ADMIN — BUSPIRONE HYDROCHLORIDE 30 MG: 10 TABLET ORAL at 04:24

## 2018-01-01 RX ADMIN — MORPHINE SULFATE 20 MG: 10 INJECTION, SOLUTION INTRAMUSCULAR; INTRAVENOUS at 12:38

## 2018-01-01 RX ADMIN — SODIUM CHLORIDE, SODIUM GLUCONATE, SODIUM ACETATE, POTASSIUM CHLORIDE, MAGNESIUM CHLORIDE, SODIUM PHOSPHATE, DIBASIC, AND POTASSIUM PHOSPHATE 100 ML/HR: .53; .5; .37; .037; .03; .012; .00082 INJECTION, SOLUTION INTRAVENOUS at 06:00

## 2018-01-01 RX ADMIN — FAMOTIDINE 20 MG: 20 TABLET, FILM COATED ORAL at 17:40

## 2018-01-01 RX ADMIN — GLYCOPYRROLATE 0.2 MG: 0.2 INJECTION, SOLUTION INTRAMUSCULAR; INTRAVENOUS at 12:36

## 2018-01-01 RX ADMIN — METOPROLOL TARTRATE 5 MG: 1 INJECTION, SOLUTION INTRAVENOUS at 05:19

## 2018-01-01 RX ADMIN — GLYCOPYRROLATE 0.2 MG: 0.2 INJECTION, SOLUTION INTRAMUSCULAR; INTRAVENOUS at 19:50

## 2018-01-01 RX ADMIN — METOPROLOL TARTRATE 5 MG: 1 INJECTION, SOLUTION INTRAVENOUS at 10:47

## 2018-01-01 RX ADMIN — METOPROLOL TARTRATE 5 MG: 1 INJECTION, SOLUTION INTRAVENOUS at 05:17

## 2018-01-01 RX ADMIN — METOPROLOL TARTRATE 5 MG: 1 INJECTION, SOLUTION INTRAVENOUS at 04:25

## 2018-01-01 RX ADMIN — SODIUM CHLORIDE, SODIUM GLUCONATE, SODIUM ACETATE, POTASSIUM CHLORIDE, MAGNESIUM CHLORIDE, SODIUM PHOSPHATE, DIBASIC, AND POTASSIUM PHOSPHATE 75 ML/HR: .53; .5; .37; .037; .03; .012; .00082 INJECTION, SOLUTION INTRAVENOUS at 00:55

## 2018-01-01 RX ADMIN — PANTOPRAZOLE SODIUM 40 MG: 40 INJECTION, POWDER, FOR SOLUTION INTRAVENOUS at 06:35

## 2018-01-01 RX ADMIN — METOPROLOL TARTRATE 5 MG: 1 INJECTION, SOLUTION INTRAVENOUS at 22:42

## 2018-01-01 RX ADMIN — ACETAMINOPHEN 650 MG: 160 SUSPENSION ORAL at 02:40

## 2018-01-01 RX ADMIN — POTASSIUM CHLORIDE 40 MEQ: 400 INJECTION, SOLUTION INTRAVENOUS at 08:07

## 2018-01-01 RX ADMIN — HEPARIN SODIUM 5000 UNITS: 5000 INJECTION, SOLUTION INTRAVENOUS; SUBCUTANEOUS at 14:43

## 2018-01-01 NOTE — RESPIRATORY THERAPY NOTE
RT Ventilator Management Note  Catracho Manuel 78 y o  female MRN: [id number]  Unit/Bed#: [place] 008-71 Encounter: [id number]      Daily Screen       12/31/2017 1111 12/31/2017 1628          Patient safety screen outcome[de-identified] Failed Failed      Not Ready for Weaning due to[de-identified] PEEP > 8cmH2O Underline problem not resolved              Physical Exam:   Assessment Type: (P) Assess only  General Appearance: (P) Unresponsive  Respiratory Pattern: (P) Assisted  Chest Assessment: (P) Chest expansion symmetrical  Bilateral Breath Sounds: (P) Diminished  Cough: None      Resp Comments: (P) no changes made overnight, pt remains on current settings and is tollerating, will cont to monitor pt overnight

## 2018-01-01 NOTE — PROGRESS NOTES
Progress Note - Critical Care   Justyn Collado 78 y o  female MRN: [id number]  Unit/Bed#: [facility] 515-01 Encounter: [id number]    Assessment:   Principal Problem:    Cardiac arrest  Active Problems:    Acute encephalopathy-suspected related to anoxic injury    Acute respiratory failure    Hemoptysis- improved    V-tach/NSVT- improved    Leukocytosis     Respiratory aklalosis    Transaminitis related to shocked liver    Hypomagnesemia    Hypokalemia    Obesity BMI 30  Resolved Problems:  Lactic acidosis  Metabolic acidosis    Plan:   Neuro:   · Continue serial neuro exams  · Goal Normothermia  · Goal Euglycemia  CV:   · Continue to monitor hemodynamics   · Continue ASA   · Bblocker  Lung:   · Continue full vent support  · Not a candidate for SBT  GI:   · Continue Protonix  FEN:   · Replace potassium with 40meq KCL  · Repeat BMP in the AM  :   · Maintain wagner catheter  · Strict I &O  ID:   · Follow fever and wbc trend  Heme:   · On Heparin gtt, will discuss with cardiology duration  Endo:   · SSI  Msk/Skin:   · Turn and off load pressure points Q2 hours  Disposition:   · Continue in critical care unit  · GOL following    ______________________________________________________________________  Chief Complaint:   Pt intubated and encephalopathic- unable to obtain    HPI/24hr events:   Family meeting yesterday  ______________________________________________________________________  Temperature:   Temp (24hrs), Av 6 °F (37 °C), Min:98 4 °F (36 9 °C), Max:98 8 °F (37 1 °C)    Current Temperature: 98 8 °F (37 1 °C)    Vitals:    18 0500 18 0600 18 0700 18 0813   BP: 121/62  121/64    Pulse: 70  64 68   Resp: 18      Temp:       TempSrc:       SpO2: 97%   98%   Weight:  88 9 kg (195 lb 15 8 oz)     Height:         Arterial Line BP: 154/68  Arterial Line MAP (mmHg): 96 mmHg     Weights:   IBW: 61 6 kg    Body mass index is 30 7 kg/m²    Weight (last 2 days)     Date/Time   Weight    18 0600  88 9 (195 99)    12/31/17 0552  88 7 (195 55)    12/30/17 0500  87 5 (192 9)            Height: 5' 7" (170 2 cm)    Ventilator Settings:  Respiratory    Lab Data (Last 4 hours)    None         O2/Vent Data (Last 4 hours)      01/01 0813           Vent Mode AC/VC       Resp Rate (BPM) (BPM) 18       Vt (mL) (mL) 400       FIO2 (%) (%) 50       PEEP (cmH2O) (cmH2O) 10       Patient safety screen outcome: Passed       MV 8                   Lab Results   Component Value Date    PHART 7 517 (H) 01/01/2018    QTF2BJW 27 9 (LL) 01/01/2018    PO2ART 103 5 01/01/2018    XTL3QLW 22 1 01/01/2018    BEART -0 2 01/01/2018    SOURCE Line, Arterial 01/01/2018     SpO2: SpO2: 98 %    ______________________________________________________________________  Physical Exam:  Lewis Agitation Sedation Scale (RASS): Unarousable  Physical Exam   Constitutional: She appears well-developed and well-nourished  No distress  She is intubated  HENT:   Head: Normocephalic  Tongue swollen   Eyes:   Scleral edema  Pupils 2mm and sluggish reactive to light   Neck: Neck supple  No JVD present  Cardiovascular: Normal rate, regular rhythm and normal heart sounds  Pulses:       Radial pulses are 2+ on the right side, and 2+ on the left side  Dorsalis pedis pulses are 2+ on the right side, and 2+ on the left side  Pulmonary/Chest: No accessory muscle usage  She is intubated  No respiratory distress  She has rhonchi  Abdominal: Soft  Bowel sounds are decreased  NAOMI tenderness secondary to encephalopathy   Genitourinary:   Genitourinary Comments: Johnson catheter to gravity with slightly concentrated UO   Neurological: GCS eye subscore is 1  GCS verbal subscore is 1  GCS motor subscore is 1  No corneals, no cough, no gag, pt with spontaneous respirations  Does not withdraw to peripheral noxious stimuli   Skin: Skin is warm, dry and intact  She is not diaphoretic  No cyanosis  Nails show no clubbing  ______________________________________________________________________  Intake and Outputs:  I/O       12/30 0701 - 12/31 0700 12/31 0701 - 01/01 0700 01/01 0701 - 01/02 0700    I V  (mL/kg) 3903 3 (44) 3256 3 (36 6) 565 2 (6 4)    NG/GT 0 30     IV Piggyback 130      Total Intake(mL/kg) 4033 3 (45 5) 3286 3 (37) 565 2 (6 4)    Urine (mL/kg/hr) 835 (0 4) 1260 (0 6) 100 (0 4)    Emesis/NG output 0 (0) 250 (0 1) 0 (0)    Stool 0 (0)      Total Output 835 1510 100    Net +3198 3 +1776 3 +465 2               UOP: 50/hour   Nutrition:        Diet Orders            Start     Ordered    12/29/17 1915  Diet NPO  Diet effective now     Question:  Diet Type  Answer:  NPO    12/29/17 1922          Labs:     Results from last 7 days  Lab Units 01/01/18 0431 12/31/17  0431 12/30/17  0511  12/29/17  2311 12/29/17  1947   WBC Thousand/uL 13 12* 19 82* 8 36  < > 6 86 13 62*   HEMOGLOBIN g/dL 8 9* 11 1* 12 1  < > 12 7 13 0   HEMATOCRIT % 26 1* 32 6* 35 9  < > 37 2 39 1   PLATELETS Thousands/uL 133* 149 145*  < > 166 177   NEUTROS PCT %  --   --  88*  --  84* 80*   MONOS PCT %  --   --  5  --  3* 1*   MONO PCT MAN %  --  2*  --   --   --   --    < > = values in this interval not displayed  Results from last 7 days  Lab Units 01/01/18  0431 12/31/17  0431 12/30/17  2139  12/30/17  0511   SODIUM mmol/L 141 141 144  < > 145   POTASSIUM mmol/L 3 3* 4 0 4 0  < > 3 6   CHLORIDE mmol/L 107 106 110*  < > 110*   CO2 mmol/L 26 27 25  < > 24   BUN mg/dL 27* 31* 29*  < > 27*   CREATININE mg/dL 0 88 1 14 1 15  < > 1 28   CALCIUM mg/dL 7 7* 8 0* 8 3  < > 8 7   ALBUMIN g/dL 2 0* 2 4*  --   --  2 4*   TOTAL PROTEIN g/dL 5 1* 5 6*  --   --  5 0*   BILIRUBIN TOTAL mg/dL 0 81 0 66  --   --  0 61   ALK PHOS U/L 77 71  --   --  72   ALT U/L 141* 236*  --   --  325*   AST U/L 203* 329*  --   --  791*   GLUCOSE RANDOM mg/dL 120 148* 155*  < > 183*   < > = values in this interval not displayed      Results from last 7 days  Lab Units 01/01/18  5015 12/31/17  0431 12/30/17  1629   MAGNESIUM mg/dL 2 0 2 3 2 4       Results from last 7 days  Lab Units 01/01/18  0431 12/31/17  0431 12/30/17  1017   PHOSPHORUS mg/dL 2 4 3 5 3 0        Results from last 7 days  Lab Units 01/01/18  0431 12/31/17  2045 12/31/17  1240  12/30/17  0338 12/29/17  1742   INR   --   --   --   --  1 30* 1 33*   PTT seconds 63* 63* 49*  < > 28 41*   < > = values in this interval not displayed      Results from last 7 days  Lab Units 01/01/18  0431   LACTIC ACID mmol/L 1 7       0  Lab Value Date/Time   TROPONINI 26 80 (H) 12/30/2017 0821   TROPONINI 34 00 (H) 12/30/2017 0222   TROPONINI 27 70 (H) 12/29/2017 2311   TROPONINI 9 15 (H) 12/29/2017 1947       Micro:  Blood Culture:   Lab Results   Component Value Date    BLOODCX No Growth at 48 hrs  12/29/2017    BLOODCX No Growth at 48 hrs  12/29/2017     Urine Culture: No results found for: URINECX  Sputum Culture: No components found for: SPUTUMCX  Wound Culure: No results found for: [facility]     Lab Results   Component Value Date    BLOODCX No Growth at 48 hrs  12/29/2017    BLOODCX No Growth at 48 hrs  12/29/2017     Allergies: No Known Allergies  Medications:   Scheduled Meds:  aspirin 325 mg Per OG Tube Daily   busPIRone 30 mg Oral Q8H   chlorhexidine 15 mL Swish & Spit Q12H [facility]   famotidine 20 mg Intravenous BID AC   insulin lispro 1-6 Units Subcutaneous Q6H [facility]   metoprolol 5 mg Intravenous Q6H   pantoprazole 40 mg Intravenous Early Morning   scopolamine 1 patch Transdermal Q72H     Continuous Infusions:  amiodarone 1 mg/min Last Rate: 1 mg/min (01/01/18 0749)   heparin (porcine) 3-20 Units/kg/hr (Order-Specific) Last Rate: 10 Units/kg/hr (12/31/17 1433)   multi-electrolyte 100 mL/hr Last Rate: 100 mL/hr (01/01/18 0600)     PRN Meds:    fentanyl citrate (PF) 50 mcg Q2H PRN   heparin (porcine) 2,000 Units PRN   heparin (porcine) 4,000 Units PRN   hydrALAZINE 5 mg Q6H PRN     VTE Pharmacologic Prophylaxis:   Pharmacologic: Heparin Drip  Mechanical VTE Prophylaxis in Place: Yes    Invasive lines and devices: Invasive Devices     Central Venous Catheter Line            CVC Central Lines 12/30/17 Triple 16cm 2 days          Peripheral Intravenous Line            Peripheral IV 12/29/17 Left Antecubital 2 days          Arterial Line            Arterial Line 12/29/17 Right Radial 2 days          Drain            NG/OG/Enteral Tube Orogastric Center mouth 2 days    Urethral Catheter Temperature probe 16 Fr  2 days          Airway            ETT  8 mm 2 days                   Counseling / Coordination of Care  Total time spent today 35 minutes  Greater than 50% of total time was spent with the patient and / or family counseling and / or coordination of care  A description of the counseling / coordination of care: findings of neurologic exam, goals of care    Code Status: Level 1 - Full Code    Portions of the record may have been created with voice recognition software  Occasional wrong word or "sound a like" substitutions may have occurred due to the inherent limitations of voice recognition software  Read the chart carefully and recognize, using context, where substitutions have occurred      DEVAN Hilario

## 2018-01-01 NOTE — RESPIRATORY THERAPY NOTE
RT Ventilator Management Note  Darian Dopp 78 y o  female MRN: [id number]  Unit/Bed#: [facility] 896-49 Encounter: [id number]      Daily Screen       12/31/2017 1628 1/1/2018 0813          Patient safety screen outcome[de-identified] Failed Passed      Not Ready for Weaning due to[de-identified] Underline problem not resolved Underline problem not resolved              Physical Exam:   Assessment Type: Assess only  General Appearance: Sleeping  Respiratory Pattern: Assisted  Chest Assessment: Chest expansion symmetrical  Bilateral Breath Sounds: Diminished  O2 Device: vent 50%      Resp Comments: Recived pt 0700 on current vent settings AC 18,400,,50% +10 tolerating with no distress noted at this point   will continue to monitor

## 2018-01-01 NOTE — PROGRESS NOTES
Critical Care Interval Progress Note     Lei Merritt 78 y o  female MRN: [id number]    Unit/Bed#: [facility] 846-61 Encounter: [id number]    Impression:  Principal Problem:    Cardiac arrest  Active Problems:    Acute encephalopathy    Acute respiratory failure    V-tach    Leukocytosis    Transaminitis    Hypomagnesemia    Hypokalemia    Respiratory alkalosis    Obesity (BMI 30 0-34  9)  Resolved Problems:    Hemoptysis    Lactic acidosis    Metabolic acidosis    Cardiac arrest initial shockable rhythm and asystole  Acute hypoxic respiratory failure  NSVT  Encephalopathy concern for anoxic brain injury status post cardiac arrest  Bilateral pulmonary opacities pneumonia versus aspiration versus pulmonary contusions  Transaminitis-likely shock liver  Hyperglycemia  Coagulopathy  Hemoptysis-unclear etiology    Plan:  The patient is worsening on neurologic examination  Continue with supportive measures  Patient is not appropriate for spontaneous breathing trial   [facility] meeting with family this morning family wishes to continue to support patient and re-evaluate neurological status  Patient does not transition by Wednesday morning plan to extubate to comfort measures  Patient is maintaining hemodynamic status without vaso active medications support  Continue to monitor electrolytes and LFTs and BUN and creatinine  Hemoptysis is since resolved  Plan to discontinue therapeutic temperature management  Counseling / Coordination of Care: Total Critical Care time spent Forty-one minutes excluding procedures, teaching and family updates      ______________________________________________________________________    Chief Complaint:  Cardiac arrest    Recent Events / Nursing Concern:  Patient declining in neurologic exam     Vitals:   Vitals:    01/01/18 1300 01/01/18 1341 01/01/18 1400 01/01/18 1500   BP: 146/77 152/79 153/82 167/87   Pulse: 68 70 68 70   Resp:   13 12   Temp:       TempSrc:       SpO2:   97% 96% Weight:       Height:         Arterial Line BP: 170/80  Arterial Line MAP (mmHg): 112 mmHg    Temperature: Temp (24hrs), Av 7 °F (37 1 °C), Min:98 5 °F (36 9 °C), Max:98 8 °F (37 1 °C)  Current: Temperature: 98 6 °F (37 °C) (artic sun)    Hemodynamic Monitoring:  CVP:         Respiratory:  SpO2: SpO2: 97 %, SpO2 Activity: SpO2 Activity: At Rest, SpO2 Device: O2 Device: Other (comment) (vent ac 12/400/+10)       Physical Exam:  Physical Exam   Constitutional: She appears well-developed and well-nourished  No distress  HENT:   Head: Normocephalic and atraumatic  Endotracheal tube in place   Eyes: Right eye exhibits no discharge  Left eye exhibits no discharge  No scleral icterus  Injected conjunctiva, pupils irregular and dilated please see neurologic exam   Neck: Normal range of motion  Neck supple  No tracheal deviation present  Cardiovascular: Normal rate and regular rhythm  No murmur heard  Pulmonary/Chest: Breath sounds normal  She has no wheezes  She has no rales  Abdominal: Soft  Bowel sounds are normal  She exhibits no distension  There is no tenderness  Genitourinary:   Genitourinary Comments: Urinary catheter in place   Musculoskeletal: Normal range of motion  She exhibits no edema or deformity  Neurological:   Patient with GCS of 3 T  she does not withdrawal to pain on any extremities  She has no gag or cough reflex  She has no corneal reflex, pupils initially were 2 mm and sluggishly reactive on reexamination at 6:09 p m  patient's pupils 5 mm on the left and 4 mm on the right unresponsive to light  Patient does have spontaneous respirations  Skin: Skin is warm and dry  Psychiatric: She has a normal mood and affect  Her behavior is normal    Nursing note and vitals reviewed          Allergies: No Known Allergies    Medications:   Scheduled Meds:  aspirin 325 mg Per OG Tube Daily   chlorhexidine 15 mL Swish & Spit Q12H [address]   famotidine 20 mg Intravenous BID AC   heparin (porcine) 5,000 Units Subcutaneous Q8H [facility]   insulin lispro 1-6 Units Subcutaneous Q6H [facility]   metoprolol 5 mg Intravenous Q6H   pantoprazole 40 mg Intravenous Early Morning   potassium chloride 40 mEq Intravenous Once   scopolamine 1 patch Transdermal Q72H     Continuous Infusions:  amiodarone 1 mg/min Last Rate: 1 mg/min (01/01/18 0749)   multi-electrolyte 100 mL/hr Last Rate: 100 mL/hr (01/01/18 1353)     PRN Meds:    fentanyl citrate (PF) 50 mcg Q2H PRN   hydrALAZINE 5 mg Q6H PRN       Labs:     Results from last 7 days  Lab Units 01/01/18  0431 12/31/17  0431 12/30/17  0511  12/29/17  2311 12/29/17  1947   WBC Thousand/uL 13 12* 19 82* 8 36  < > 6 86 13 62*   HEMOGLOBIN g/dL 8 9* 11 1* 12 1  < > 12 7 13 0   HEMATOCRIT % 26 1* 32 6* 35 9  < > 37 2 39 1   PLATELETS Thousands/uL 133* 149 145*  < > 166 177   NEUTROS PCT %  --   --  88*  --  84* 80*   MONOS PCT %  --   --  5  --  3* 1*   MONO PCT MAN %  --  2*  --   --   --   --    < > = values in this interval not displayed  Results from last 7 days  Lab Units 01/01/18 0431 12/31/17  0431 12/30/17  2139  12/30/17  0511   SODIUM mmol/L 141 141 144  < > 145   POTASSIUM mmol/L 3 3* 4 0 4 0  < > 3 6   CHLORIDE mmol/L 107 106 110*  < > 110*   CO2 mmol/L 26 27 25  < > 24   BUN mg/dL 27* 31* 29*  < > 27*   CREATININE mg/dL 0 88 1 14 1 15  < > 1 28   CALCIUM mg/dL 7 7* 8 0* 8 3  < > 8 7   TOTAL PROTEIN g/dL 5 1* 5 6*  --   --  5 0*   BILIRUBIN TOTAL mg/dL 0 81 0 66  --   --  0 61   ALK PHOS U/L 77 71  --   --  72   ALT U/L 141* 236*  --   --  325*   AST U/L 203* 329*  --   --  791*   GLUCOSE RANDOM mg/dL 120 148* 155*  < > 183*   < > = values in this interval not displayed      Results from last 7 days  Lab Units 01/01/18  0431 12/31/17  0431 12/30/17  1629   MAGNESIUM mg/dL 2 0 2 3 2 4       Results from last 7 days  Lab Units 01/01/18  0431 12/31/17  0431 12/30/17  1017   PHOSPHORUS mg/dL 2 4 3 5 3 0        Results from last 7 days  Lab Units 01/01/18 0431 12/31/17  2045 12/31/17  1240  12/30/17  0338 12/29/17  1742   INR   --   --   --   --  1 30* 1 33*   PTT seconds 63* 63* 49*  < > 28 41*   < > = values in this interval not displayed  Results from last 7 days  Lab Units 01/01/18  1000 01/01/18  0431 12/31/17  2221 12/31/17  1652 12/31/17  1007   LACTIC ACID mmol/L 1 7 1 7 1 7 2 0 2 3*       0  Lab Value Date/Time   TROPONINI 26 80 (H) 12/30/2017 0821   TROPONINI 34 00 (H) 12/30/2017 0222   TROPONINI 27 70 (H) 12/29/2017 2311   TROPONINI 9 15 (H) 12/29/2017 1947       Results from last 7 days  Lab Units 01/01/18  1000 01/01/18  0432 12/31/17  2221 12/31/17  1654 12/31/17  1007 12/31/17  0431 12/30/17  2139   PH ART  7 511* 7 517* 7 500* 7 498* 7 481* 7 481* 7 505*   PCO2 ART mm Hg 29 5* 27 9* 33 9* 32 8* 32 3* 32 8* 30 0*   PO2 ART mm Hg 105 3 103 5 87 0 90 4 166 3* 158 1* 126 8   HCO3 ART mmol/L 23 1 22 1 25 8 24 9 23 6 23 9 23 1   BASE EXC ART mmol/L 0 5 -0 2 2 8 2 0 0 6 0 9 0 7   ABG SOURCE  Line, Arterial Line, Arterial Line, Arterial Line, Arterial Line, Arterial Line, Arterial Line, Arterial       Diagnostic Imaging / Data: I have personally reviewed pertinent reports  and I have personally reviewed pertinent films in PACS no new imaging data  EKG:  Normal sinus rhythm on telemetry  Code Status: Level 1 - Full Code    Portions of the record may have been created with voice recognition software  Occasional wrong word or "sound a like" substitutions may have occurred due to the inherent limitations of voice recognition software  Read the chart carefully and recognize, using context, where substitutions have occurred      SIGNATURE: Lesia Dobbins DO  DATE: January 1, 2018  TIME: 3:28 PM

## 2018-01-01 NOTE — PROCEDURES
Continuous Video EEG Monitoring       Patient Name:  Brad Osman  MRN: [id number]   :  1950 File #: Homero Cortez    Age: 78 y o  Encounter #: [id number]   Date Performed: 2017  Date EEG Reviewed: 2017    Report date: 2018          Study type: Continuous video EEG    ICD 10 diagnosis: anoxic encephalopathy    Start time: 0800 on  End time: 1430 on      -------------------------------------------------------------------------------------------------------------------   Patient History:  78year old female presents after cardiac arrest  Per  pt was sitting in kitchen when he heard her groaning  When he went into the kitchen he found her slumped in chair,  began cpr and called EMS  Patient had ROSC enroute to hospital but went into [facility]  Patient reportedly  initially had a pulse and was given an amiodarone bolus but thn proceeded to PEA  While in ICU patientt was noted to have facial twitching  This recording was performed to determine whether episodes of facial twitching are seizures and to determine if patient is having subtle/subclinical electrographic seizures  Medications include:     aspirin 325 mg Per OG Tube Daily   chlorhexidine 15 mL Swish & Spit Q12H [address]   famotidine 20 mg Intravenous BID AC   heparin (porcine) 5,000 Units Subcutaneous Q8H [address]   insulin lispro 1-6 Units Subcutaneous Q6H [address]   metoprolol 5 mg Intravenous Q6H   pantoprazole 40 mg Intravenous Early Morning   potassium chloride 40 mEq Intravenous Once   scopolamine 1 patch Transdermal Q72H     The patient is normothermic and is not receiving intravenous sedation   -------------------------------------------------------------------------------------------------------------------   Description of Procedure:  · 32 channel digital recording with electrodes placed according to the International 10-20 system with additional T1/T2 electrodes, EOG, EKG, and simultaneous video   A monitoring technologist supervised the continuous recording  The recording was technically satisfactory  -------------------------------------------------------------------------------------------------------------------   Results:   Background Activity:   Throughout the recording the background activity is continuously diffusely suppressed  Activation Procedures:   Neither hyperventilation nor photic stimulation was performed  Abnormal Findings:  See "Background Activity"  No focal abnormalities nor interictal epileptiform discharges were noted  No electrographic seizures occurred during the recording  Other findings: The single lead EKG demonstrated a regular  rhythm  Events:   No push button events occurred during this study  No periods of facial twitching were noted  -------------------------------------------------------------------------------------------------------------------   Interpretation:   Markedly abnormal 7 hour continuous video-EEG recording, continuous diffuse suppression of the background activity consistent with severe diffuse cerebral dysfunction  When viewed at the standard sensitivity of 7 microvolts per mm, no focal abnormalities nor interictal epileptiform discharges were noted  No electrographic seizures occurred during the recording       Bassem Ng, [address] [facility]  Pager # [phone]

## 2018-01-02 PROBLEM — G93.1 ANOXIC BRAIN INJURY (HCC): Status: ACTIVE | Noted: 2018-01-01

## 2018-01-02 NOTE — PROGRESS NOTES
Critical Care attending Progress Note     Valentin Anguiano 78 y o  female MRN: [id number]    Unit/Bed#: [facility] 325-28 Encounter: [id number]    Impression:  Principal Problem:    Cardiac arrest  Active Problems:    Acute encephalopathy    Acute respiratory failure    Anoxic brain injury ([address] )    V-tach    Leukocytosis    Transaminitis    Hypomagnesemia    Hypokalemia    Respiratory alkalosis    Obesity (BMI 30 0-34  9)  Resolved Problems:    Hemoptysis    Lactic acidosis    Metabolic acidosis    Cardiac arrest initial shockable rhythm and asystole  Acute hypoxic respiratory failure  NSVT  NTEMI  Encephalopathy concern for anoxic brain injury status post cardiac arrest  Bilateral pulmonary opacities pneumonia versus aspiration versus pulmonary contusions  Transaminitis-likely shock liver  Hyperglycemia  Coagulopathy  Hemoptysis-unclear etiology resolved    Plan:    Patient with unequal and dilated pupils today  She continues to have spontaneous respirations on the ventilator  Hemodynamically patient has remained appropriate  Continue with supportive measures  Avoid all sedation medications at this time  Patient's family wishes to continue with supportive care with possible transition to organ donation status  Monitor electrolytes and renal function  Replete as necessary electrolytes  Plan for reassessment later this afternoon  Counseling / Coordination of Care: Total Critical Care time spent Thirty-seven minutes excluding procedures, teaching and family updates      ______________________________________________________________________    Chief Complaint:  Cardiac arrest    Recent Events / Nursing Concern: patient continues to have decline in her neurologic exam   Patient still with spontaneous respirations    Vitals:   Vitals:    01/02/18 1100 01/02/18 1200 01/02/18 1300 01/02/18 1400   BP: 150/71 161/81 164/81 164/87   Pulse: 64 76 78 78   Resp: 15 22 (!) 26 20   Temp: 98 6 °F (37 °C) 99 3 °F (37 4 °C) 99 3 °F (37 4 °C) 98 2 °F (36 8 °C)   TempSrc:  Probe     SpO2: 97% 97% 98% 96%   Weight:       Height:         Arterial Line BP: 170/80  Arterial Line MAP (mmHg): 112 mmHg    Temperature: Temp (24hrs), Av 2 °F (37 3 °C), Min:98 2 °F (36 8 °C), Max:100 °F (37 8 °C)  Current: Temperature: 98 2 °F (36 8 °C)    Hemodynamic Monitoring:  CVP:         Respiratory:  SpO2: SpO2: 96 %, SpO2 Activity: SpO2 Activity: At Rest, SpO2 Device: O2 Device: Other (comment) (vent)       Physical Exam:  Physical Exam   Constitutional: She appears well-developed and well-nourished  No distress  HENT:   Head: Normocephalic and atraumatic  Endotracheal tube present   Eyes: Right eye exhibits no discharge  Left eye exhibits no discharge  No scleral icterus  Conjunctiva are injected and edematous   Neck: Normal range of motion  Neck supple  No tracheal deviation present  Cardiovascular: Normal rate and regular rhythm  No murmur heard  Pulmonary/Chest: Breath sounds normal  She has no wheezes  She has no rales  Abdominal: Soft  Bowel sounds are normal  She exhibits no distension  There is no tenderness  Genitourinary:   Genitourinary Comments: Urinary catheter in place   Musculoskeletal: Normal range of motion  She exhibits no edema or deformity  Neurological:   Pupils are dilated and unreactive  Spontaneous respirations on ventilator  Corneal reflexes are absent  Cough and gag reflex are absent  Patient does not withdrawal x4 extremities to noxious stimuli  GCS 3 T   Skin: Skin is warm and dry  Psychiatric: She has a normal mood and affect  Her behavior is normal    Nursing note and vitals reviewed          Allergies: No Known Allergies    Medications:   Scheduled Meds:  aspirin 325 mg Per OG Tube Daily   chlorhexidine 15 mL Swish & Spit Q12H [address]   famotidine 20 mg Per G Tube BID   heparin (porcine) 5,000 Units Subcutaneous Q8H [address]   insulin lispro 1-6 Units Subcutaneous Q6H [address]   metoprolol 5 mg Intravenous Q6H scopolamine 1 patch Transdermal Q72H     Continuous Infusions:  amiodarone 1 mg/min Last Rate: 1 mg/min (01/02/18 0031)   multi-electrolyte 75 mL/hr Last Rate: 75 mL/hr (01/02/18 1000)     PRN Meds:    hydrALAZINE 5 mg Q6H PRN       Labs:     Results from last 7 days  Lab Units 01/02/18  0856 01/01/18  0431 12/31/17  0431 12/30/17  0511  12/29/17  2311 12/29/17  1947   WBC Thousand/uL 14 61* 13 12* 19 82* 8 36  < > 6 86 13 62*   HEMOGLOBIN g/dL 9 8* 8 9* 11 1* 12 1  < > 12 7 13 0   HEMATOCRIT % 29 3* 26 1* 32 6* 35 9  < > 37 2 39 1   PLATELETS Thousands/uL 151 133* 149 145*  < > 166 177   NEUTROS PCT %  --   --   --  88*  --  84* 80*   MONOS PCT %  --   --   --  5  --  3* 1*   MONO PCT MAN %  --   --  2*  --   --   --   --    < > = values in this interval not displayed  Results from last 7 days  Lab Units 01/02/18  0856 01/01/18  2233 01/01/18 0431 12/31/17  0431   SODIUM mmol/L 140 138 141 141   POTASSIUM mmol/L 3 4* 3 4* 3 3* 4 0   CHLORIDE mmol/L 107 105 107 106   CO2 mmol/L 25 24 26 27   BUN mg/dL 21 25 27* 31*   CREATININE mg/dL 0 79 0 79 0 88 1 14   CALCIUM mg/dL 8 3 7 9* 7 7* 8 0*   TOTAL PROTEIN g/dL 5 9*  --  5 1* 5 6*   BILIRUBIN TOTAL mg/dL 1 43*  --  0 81 0 66   ALK PHOS U/L 110  --  77 71   ALT U/L 106*  --  141* 236*   AST U/L 138*  --  203* 329*   GLUCOSE RANDOM mg/dL 106 115 120 148*       Results from last 7 days  Lab Units 01/01/18  0431 12/31/17  0431 12/30/17  1629   MAGNESIUM mg/dL 2 0 2 3 2 4       Results from last 7 days  Lab Units 01/01/18  0431 12/31/17  0431 12/30/17  1017   PHOSPHORUS mg/dL 2 4 3 5 3 0        Results from last 7 days  Lab Units 01/01/18  0431 12/31/17  2045 12/31/17  1240  12/30/17  0338 12/29/17  1742   INR   --   --   --   --  1 30* 1 33*   PTT seconds 63* 63* 49*  < > 28 41*   < > = values in this interval not displayed      Results from last 7 days  Lab Units 01/01/18  1000 01/01/18  0431 12/31/17  2221 12/31/17  1652 12/31/17  1007   LACTIC ACID mmol/L 1 7 1 7 1 7 2 0 2 3*       0  Lab Value Date/Time   TROPONINI 26 80 (H) 12/30/2017 0821   TROPONINI 34 00 (H) 12/30/2017 0222   TROPONINI 27 70 (H) 12/29/2017 2311   TROPONINI 9 15 (H) 12/29/2017 1947       Results from last 7 days  Lab Units 01/02/18  0449 01/01/18  1000 01/01/18  0432 12/31/17  2221 12/31/17  1654 12/31/17  1007 12/31/17  0431   PH ART  7 536* 7 511* 7 517* 7 500* 7 498* 7 481* 7 481*   PCO2 ART mm Hg 25 5* 29 5* 27 9* 33 9* 32 8* 32 3* 32 8*   PO2 ART mm Hg 80 7 105 3 103 5 87 0 90 4 166 3* 158 1*   HCO3 ART mmol/L 21 1* 23 1 22 1 25 8 24 9 23 6 23 9   BASE EXC ART mmol/L -0 6 0 5 -0 2 2 8 2 0 0 6 0 9   ABG SOURCE  Radial, Left Line, Arterial Line, Arterial Line, Arterial Line, Arterial Line, Arterial Line, Arterial       Diagnostic Imaging / Data: I have personally reviewed pertinent reports  and I have personally reviewed pertinent films in PACS no new imaging today  EKG:  Sinus rhythm on telemetry  Code Status: Level 1 - Full Code    Portions of the record may have been created with voice recognition software  Occasional wrong word or "sound a like" substitutions may have occurred due to the inherent limitations of voice recognition software  Read the chart carefully and recognize, using context, where substitutions have occurred      SIGNATURE: Kyaw Echeverria DO  DATE: January 2, 2018  TIME: 3:03 PM

## 2018-01-02 NOTE — PLAN OF CARE
Problem: Prexisting or High Potential for Compromised Skin Integrity  Goal: Skin integrity is maintained or improved  INTERVENTIONS:  - Identify patients at risk for skin breakdown  - Assess and monitor skin integrity  - Assess and monitor nutrition and hydration status  - Monitor labs (i e  albumin)  - Assess for incontinence   - Turn and reposition patient  - Assist with mobility/ambulation  - Relieve pressure over bony prominences  - Avoid friction and shearing  - Provide appropriate hygiene as needed including keeping skin clean and dry  - Evaluate need for skin moisturizer/barrier cream  - Collaborate with interdisciplinary team (i e  Nutrition, Rehabilitation, etc )   - Patient/family teaching   Outcome: Progressing      Problem: PAIN - ADULT  Goal: Verbalizes/displays adequate comfort level or baseline comfort level  Interventions:  - Encourage patient to monitor pain and request assistance  - Assess pain using appropriate pain scale  - Administer analgesics based on type and severity of pain and evaluate response  - Implement non-pharmacological measures as appropriate and evaluate response  - Consider cultural and social influences on pain and pain management  - Notify physician/advanced practitioner if interventions unsuccessful or patient reports new pain   Outcome: Progressing      Problem: INFECTION - ADULT  Goal: Absence or prevention of progression during hospitalization  INTERVENTIONS:  - Assess and monitor for signs and symptoms of infection  - Monitor lab/diagnostic results  - Monitor all insertion sites, i e  indwelling lines, tubes, and drains  - Monitor endotracheal (as able) and nasal secretions for changes in amount and color  - [place] appropriate cooling/warming therapies per order  - Administer medications as ordered  - Instruct and encourage patient and family to use good hand hygiene technique  - Identify and instruct in appropriate isolation precautions for identified infection/condition   Outcome: Progressing    Goal: Absence of fever/infection during neutropenic period  INTERVENTIONS:  - Monitor WBC  - Implement neutropenic guidelines   Outcome: Progressing      Problem: SAFETY ADULT  Goal: Patient will remain free of falls  INTERVENTIONS:  - Assess patient frequently for physical needs  -  Identify cognitive and physical deficits and behaviors that affect risk of falls    -  [place] fall precautions as indicated by assessment   - Educate patient/family on patient safety including physical limitations  - Instruct patient to call for assistance with activity based on assessment  - Modify environment to reduce risk of injury  - Consider OT/PT consult to assist with strengthening/mobility    Outcome: Progressing    Goal: Maintain or return to baseline ADL function  INTERVENTIONS:  -  Assess patient's ability to carry out ADLs; assess patient's baseline for ADL function and identify physical deficits which impact ability to perform ADLs (bathing, care of mouth/teeth, toileting, grooming, dressing, etc )  - Assess/evaluate cause of self-care deficits   - Assess range of motion  - Assess patient's mobility; develop plan if impaired  - Assess patient's need for assistive devices and provide as appropriate  - Encourage maximum independence but intervene and supervise when necessary  ¯ Involve family in performance of ADLs  ¯ Assess for home care needs following discharge   ¯ Request OT consult to assist with ADL evaluation and planning for discharge  ¯ Provide patient education as appropriate   Outcome: Not Progressing    Goal: Maintain or return mobility status to optimal level  INTERVENTIONS:  - Assess patient's baseline mobility status (ambulation, transfers, stairs, etc )    - Identify cognitive and physical deficits and behaviors that affect mobility  - Identify mobility aids required to assist with transfers and/or ambulation (gait belt, sit-to-stand, lift, walker, cane, etc )  - [place] fall precautions as indicated by assessment  - Record patient progress and toleration of activity level on Mobility SBAR; progress patient to next Phase/Stage  - Instruct patient to call for assistance with activity based on assessment  - Request Rehabilitation consult to assist with strengthening/weightbearing, etc    Outcome: Not Progressing      Problem: DISCHARGE PLANNING  Goal: Discharge to home or other facility with appropriate resources  INTERVENTIONS:  - Identify barriers to discharge w/patient and caregiver  - Arrange for needed discharge resources and transportation as appropriate  - Identify discharge learning needs (meds, wound care, etc )  - Arrange for interpretive services to assist at discharge as needed  - Refer to Case Management Department for coordinating discharge planning if the patient needs post-hospital services based on physician/advanced practitioner order or complex needs related to functional status, cognitive ability, or social support system   Outcome: Not Progressing      Problem: Knowledge Deficit  Goal: Patient/family/caregiver demonstrates understanding of disease process, treatment plan, medications, and discharge instructions  Complete learning assessment and assess knowledge base  Interventions:  - Provide teaching at level of understanding  - Provide teaching via preferred learning methods   Outcome: Progressing      Problem: Potential for Falls  Goal: Patient will remain free of falls  INTERVENTIONS:  - Assess patient frequently for physical needs  -  Identify cognitive and physical deficits and behaviors that affect risk of falls    -  [place] fall precautions as indicated by assessment   - Educate patient/family on patient safety including physical limitations  - Instruct patient to call for assistance with activity based on assessment  - Modify environment to reduce risk of injury  - Consider OT/PT consult to assist with strengthening/mobility Outcome: Progressing      Problem: Nutrition/Hydration-ADULT  Goal: Nutrient/Hydration intake appropriate for improving, restoring or maintaining nutritional needs  Monitor and assess patient's nutrition/hydration status for malnutrition (ex- brittle hair, bruises, dry skin, pale skin and conjunctiva, muscle wasting, smooth red tongue, and disorientation)  Collaborate with interdisciplinary team and initiate plan and interventions as ordered  Monitor patient's weight and dietary intake as ordered or per policy  Utilize nutrition screening tool and intervene per policy  Determine patient's food preferences and provide high-protein, high-caloric foods as appropriate       INTERVENTIONS:  - Monitor oral intake, urinary output, labs, and treatment plans  - Assess nutrition and hydration status and recommend course of action  - Evaluate amount of meals eaten  - Assist patient with eating if necessary   - Allow adequate time for meals  - Recommend/ encourage appropriate diets, oral nutritional supplements, and vitamin/mineral supplements  - Order, calculate, and assess calorie counts as needed  - Recommend, monitor, and adjust tube feedings and TPN/PPN based on assessed needs  - Assess need for intravenous fluids  - Provide specific nutrition/hydration education as appropriate  - Include patient/family/caregiver in decisions related to nutrition   Outcome: Not Progressing

## 2018-01-02 NOTE — PROGRESS NOTES
01/02/18 2101 [facility]   Spiritual Beliefs/Perceptions   Concept of God Accepting   Support Systems Spouse/significant other;Children;Family members   Stress Factors   Family Stress Factors Other (Comment)   Coping Responses   Family Coping Accepting;Open/discussion   Family Spiritual Assessment   Fear of Dying (none at presence )   Plan of Care   Comments Visited with    Pt  not during well [place]  Pt  organ donor  Pt  is not brain dead  they will wait until tomorrow to see if she can donate   If not they will withdraw her from the vent  Will ask chaplains to follow up tomorrow  Provided listening support     Assessment Completed by: Unit visit

## 2018-01-02 NOTE — RESPIRATORY THERAPY NOTE
RT Ventilator Management Note  Missy Standing 78 y o  female MRN: [id number]  Unit/Bed#: [facility] 501-30 Encounter: [id number]      Daily Screen       1/1/2018 1613 1/2/2018 0751          Patient safety screen outcome[de-identified] Passed Failed      Not Ready for Weaning due to[de-identified] Underline problem not resolved Underline problem not resolved              Physical Exam:   Assessment Type: Assess only  General Appearance: Sleeping  Respiratory Pattern: Assisted  Chest Assessment: Chest expansion symmetrical  Bilateral Breath Sounds: Diminished, Clear  O2 Device: vent      Resp Comments: pt remains on current settings all through night, pt is breathig over vent at tthis time,bs are clear, will cont to monitor pt overnight

## 2018-01-02 NOTE — RESPIRATORY THERAPY NOTE
RT Ventilator Management Note  Anjel Anderson 78 y o  female MRN: [id number]  Unit/Bed#: [facility] 609-98 Encounter: [id number]      Daily Screen       1/1/2018 1100 1/1/2018 1613          Patient safety screen outcome[de-identified] Passed Passed      Not Ready for Weaning due to[de-identified] Underline problem not resolved Underline problem not resolved              Physical Exam:   Assessment Type: Assess only  General Appearance: Sleeping  Respiratory Pattern: Assisted  Chest Assessment: Chest expansion symmetrical  Bilateral Breath Sounds: Diminished, Clear  O2 Device: vent      Resp Comments: pt remains on current settings all through night, pt is breathig over vent at tthis time,bs are clear, will cont to monitor pt overnight

## 2018-01-02 NOTE — CASE MANAGEMENT
Continued Stay Review    Date: 18 ICU LEVEL OF CARE    Vital Signs: /81   Pulse 76   Temp 99 3 °F (37 4 °C)   Resp 22   Ht 5' 7" (1 702 m)   Wt 91 6 kg (201 lb 15 1 oz)   SpO2 97%   BMI 31 63 kg/m²          0701   0700  0701   1246  Most Recent    Temperature (°F) 98 6100 98 699 3  99 3 (37 4)    Pulse 6280 6476  76    Respirations 035 1526  22    Blood Pressure 107/86180/75 150/71162/87  161/81    Arterial Line /74178/80        SpO2 (%) 9499 9597  97        Arterial Line BP: 170/80  Arterial Line MAP (mmHg): 112 mmHg     Temperature: Temp (24hrs), Av 7 °F (37 1 °C), Min:98 5 °F (36 9 °C), Max:98 8 °F (37 1 °C)  Current: Temperature: 98 6 °F (37 °C) (artic sun)     RESPIRATORY:  Blood Gases   (Last 5)      1654  2221  0432  1000  0449             pH, Arterial  7 498     7 500     7 517     7 511     7 536            pCO2, Arterial  32 8     33 9     27 9     29 5     25 5            pO2, Arterial  90 4     87 0     103 5     105 3     80 7            HCO3, Arterial  24 9     25 8     22 1     23 1     21 1               Respiratory Data        1628  1905  0347  0813  0355  Most Recent            Vent Mode AC/VC AC/VC AC/VC AC/VC AC/VC  AC/VC       Resp Rate (BPM) (BPM) 18 18 18 18 12  12       Vt (mL) (mL) 400 400 400 400 400  400       FIO2 (%) (%) 50 50 50 50 50  40       PEEP (cmH2O) (cmH2O) 10 10 10 10 10  10       O2 Device       Other (                   NPO    Continuous IV Infusions:   amiodarone 1 mg/min Last Rate: 1 mg/min (18 0031)   multi-electrolyte 75 mL/hr Last Rate: 75 mL/hr (18 1000)     Medications:   Scheduled Meds:   aspirin 325 mg Per OG Tube Daily   chlorhexidine 15 mL Swish & Spit Q12H [facility]   famotidine 20 mg Intravenous BID AC   heparin (porcine) 5,000 Units Subcutaneous Q8H [facility]   insulin lispro 1-6 Units Subcutaneous Q6H [facility]   metoprolol 5 mg Intravenous Q6H pantoprazole 40 mg Intravenous Early Morning   potassium chloride 40 mEq Intravenous Once   scopolamine 1 patch Transdermal Q72H     PRN Meds:     IV hydrALAZINE 5 mg q6hrs prn given x 2/ 24 hrs    LABS/Diagnostic Results:    CBC  Results from last 7 days  Lab Units 01/01/18 0431 12/31/17  0431 12/30/17  0511   12/29/17  2311 12/29/17  1947   WBC Thousand/uL 13 12* 19 82* 8 36  < > 6 86 13 62*   HEMOGLOBIN g/dL 8 9* 11 1* 12 1  < > 12 7 13 0   HEMATOCRIT % 26 1* 32 6* 35 9  < > 37 2 39 1   PLATELETS Thousands/uL 133* 149 145*  < > 166 177   NEUTROS PCT %  --   --  88*  --  84* 80*   MONOS PCT %  --   --  5  --  3* 1*   MONO PCT MAN %  --  2*  --   --   --   --       CMP  Results from last 7 days  Lab Units 01/01/18 0431 12/31/17 0431 12/30/17  2139   12/30/17  0511   SODIUM mmol/L 141 141 144  < > 145   POTASSIUM mmol/L 3 3* 4 0 4 0  < > 3 6   CHLORIDE mmol/L 107 106 110*  < > 110*   CO2 mmol/L 26 27 25  < > 24   BUN mg/dL 27* 31* 29*  < > 27*   CREATININE mg/dL 0 88 1 14 1 15  < > 1 28   CALCIUM mg/dL 7 7* 8 0* 8 3  < > 8 7   TOTAL PROTEIN g/dL 5 1* 5 6*  --   --  5 0*   BILIRUBIN TOTAL mg/dL 0 81 0 66  --   --  0 61   ALK PHOS U/L 77 71  --   --  72   ALT U/L 141* 236*  --   --  325*   AST U/L 203* 329*  --   --  791*   GLUCOSE RANDOM mg/dL 120 148* 155*  < > 183*      Results from last 7 days  Lab Units 01/01/18 0431 12/31/17  0431 12/30/17  1629   MAGNESIUM mg/dL 2 0 2 3 2 4       Results from last 7 days  Lab Units 01/01/18  0431 12/31/17  0431 12/30/17  1017   PHOSPHORUS mg/dL 2 4 3 5 3 0       Results from last 7 days  Lab Units 01/01/18  0431 12/31/17  2045 12/31/17  1240   12/30/17  0338 12/29/17  1742   INR    --   --   --   --  1 30* 1 33*   PTT seconds 63* 63* 49*  < > 28 41*      Results from last 7 days  Lab Units 01/01/18  1000 01/01/18  0431 12/31/17  2221 12/31/17  1652 12/31/17  1007   LACTIC ACID mmol/L 1 7 1 7 1 7 2 0 2 3*      Lab Value Date/Time   TROPONINI 26 80 (H) 12/30/2017 6238 TROPONINI 34 00 (H) 12/30/2017 0222   TROPONINI 27 70 (H) 12/29/2017 2311   TROPONINI 9 15 (H) 12/29/2017 1947       Results from last 7 days  Lab Units 01/01/18  1000 01/01/18  0432 12/31/17  2221 12/31/17  1654 12/31/17  1007 12/31/17  0431 12/30/17  2139   PH ART   7 511* 7 517* 7 500* 7 498* 7 481* 7 481* 7 505*   PCO2 ART mm Hg 29 5* 27 9* 33 9* 32 8* 32 3* 32 8* 30 0*   PO2 ART mm Hg 105 3 103 5 87 0 90 4 166 3* 158 1* 126 8   HCO3 ART mmol/L 23 1 22 1 25 8 24 9 23 6 23 9 23 1   BASE EXC ART mmol/L 0 5 -0 2 2 8 2 0 0 6 0 9 0 7   ABG SOURCE   Line, Arterial Line, Arterial Line, Arterial Line, Arterial Line, Arterial Line, Arterial Line, Arterial         Age/Sex: 78 y o  female     Assessment/Plan:  Impression:  Principal Problem:    Cardiac arrest  Active Problems:    Acute encephalopathy    Acute respiratory failure    V-tach    Leukocytosis    Transaminitis    Hypomagnesemia    Hypokalemia    Respiratory alkalosis    Obesity (BMI 30 0-34  9)  Resolved Problems:    Hemoptysis    Lactic acidosis    Metabolic acidosis     Cardiac arrest initial shockable rhythm and asystole  Acute hypoxic respiratory failure  NSVT  Encephalopathy concern for anoxic brain injury status post cardiac arrest  Bilateral pulmonary opacities pneumonia versus aspiration versus pulmonary contusions  Transaminitis-likely shock liver  Hyperglycemia  Coagulopathy  Hemoptysis-unclear etiology     Plan:  The patient is worsening on neurologic examination  Continue with supportive measures  Patient is not appropriate for spontaneous breathing trial   [facility] meeting with family this morning family wishes to continue to support patient and re-evaluate neurological status  Patient does not transition by Wednesday morning plan to extubate to comfort measures  Patient is maintaining hemodynamic status without vaso active medications support  Continue to monitor electrolytes and LFTs and BUN and creatinine  Hemoptysis is since resolved  Plan to discontinue therapeutic temperature management           Discharge Plan:   TO BE DETERMINED / [address] ICU

## 2018-01-02 NOTE — PROGRESS NOTES
01/02/18 1700   Clinical Encounter Type   Visited With Family   Routine Visit Follow-up   Sabianist Encounters   Sabianist Needs Prayer   Sacramental Encounters   Sacrament of Sick-Anointing Anointed   Family Spiritual Encounters   Family Coping Accepting;Open/discussion

## 2018-01-02 NOTE — PLAN OF CARE
Problem: SAFETY,RESTRAINT: NV/NON-SELF DESTRUCTIVE BEHAVIOR  Goal: Remains free of harm/injury (restraint for non violent/non self-detsructive behavior)  INTERVENTIONS:  - Instruct patient/family regarding restraint use   - Assess and monitor physiologic and psychological status   - Provide interventions and comfort measures to meet assessed patient needs   - Identify and implement measures to help patient regain control  - Assess readiness for release of restraint    Outcome: Completed Date Met: 12/31/17    Goal: Returns to optimal restraint-free functioning  INTERVENTIONS:  - Assess the patient's behavior and symptoms that indicate continued need for restraint  - Identify and implement measures to help patient regain control  - Assess readiness for release of restraint    Outcome: Completed Date Met: 12/31/17

## 2018-01-02 NOTE — PROGRESS NOTES
Progress Note - Critical Care   Sanjana Ratliff 78 y o  female MRN: [id number]  Unit/Bed#: [facility] 515-01 Encounter: [id number]    Assessment:   Principal Problem:    Cardiac arrest  Active Problems:    Acute encephalopathy likely related to acute anoxic brain injury    Acute respiratory failure    V-tach/NSVT    Leukocytosis    Transaminitis related to shocked liver- improvong    Hypomagnesemia    Hypokalemia    Respiratory alkalosis    Obesity (BMI 30 0-34  9)  Resolved Problems:    Hemoptysis    Lactic acidosis    Metabolic acidosis    Plan:   Neuro:   · Serial neuro exams  · [facility] following  · D/c fentanyl  CV:   · Continue to monitor hemodynamics  · Continue amiodarone infusion  · Continue aspirin and beta-blocker  Lung:   · Full vent support, patient not a candidate for spontaneous breathing trial secondary to encephalopathy  GI:   · Continue proton pump inhibitor  FEN:   · Repeat BMP in the AM  · Decrease IVF to 75ml/hr  · Replace with 40meQ IV x 1  :   · Maintain wagner for comfort  ID:   · No indication for abx at this time  · Trend WBC and Fever Curve  Heme:   · Transfuse for hemoglobin less than 7  · SQ heparin for VTE prophylaxis  Endo:   · SSI  Msk/Skin:   · Turn position every  Disposition:   · Continue with full care at this time, family awaiting to see if patient transitions to brain death prior to Wednesday morning at 8:00 a m  for possible [facility] organ donation  ·  at bedside and updated on plan of care and exam findings    ______________________________________________________________________  Chief Complaint:   Patient intubated and encephalopathic, unable to provide    HPI/24hr events:  Fentanyl x1 dose  ______________________________________________________________________  Temperature:   Temp (24hrs), Av 2 °F (37 3 °C), Min:98 6 °F (37 °C), Max:100 °F (37 8 °C)    Current Temperature: 99 3 °F (37 4 °C)    Vitals:    18 0518 18 0536 18 0618 0630   BP: 164/89  147/85 163/80   Pulse: 80 68 70 72   Resp: (!) 29 (!) 6 (!) 23 (!) 25   Temp: 99 3 °F (37 4 °C) 99 3 °F (37 4 °C) 99 3 °F (37 4 °C) 99 3 °F (37 4 °C)   TempSrc:       SpO2: 97% 97% 97% 97%   Weight:   91 6 kg (201 lb 15 1 oz)    Height:          Weights:   IBW: 61 6 kg    Body mass index is 31 63 kg/m²  Weight (last 2 days)     Date/Time   Weight    01/02/18 0600  91 6 (201 94)    01/01/18 0600  88 9 (195 99)    12/31/17 0552  88 7 (195 55)            Height: 5' 7" (170 2 cm)     Respiratory    Lab Data (Last 4 hours)    None         O2/Vent Data (Last 4 hours)      01/02 0751           Vent Mode AC/VC       Resp Rate (BPM) (BPM) 12       Vt (mL) (mL) 400       FIO2 (%) (%) 50       PEEP (cmH2O) (cmH2O) 10       Patient safety screen outcome: Failed       MV 11 3                 Lab Results   Component Value Date    PHART 7 536 (H) 01/02/2018    GFU9UWW 25 5 (LL) 01/02/2018    PO2ART 80 7 01/02/2018    CMR6DHN 21 1 (L) 01/02/2018    BEART -0 6 01/02/2018    SOURCE Radial, Left 01/02/2018     SpO2: SpO2: 97 %    ______________________________________________________________________  Physical Exam:  Lewis Agitation Sedation Scale (RASS): Unarousable  Physical Exam   Constitutional: No distress  She is intubated  HENT:   Mouth/Throat: Mucous membranes are normal    Tongue swelling     Eyes:   R pupil 4mm unreactive  L pupuil 5mm unreactive  Scleral edema b/l   Neck: Neck supple  No JVD present  Cardiovascular: Normal rate, regular rhythm and normal heart sounds  Exam reveals no gallop and no friction rub  No murmur heard  Pulses:       Radial pulses are 2+ on the right side, and 2+ on the left side  Dorsalis pedis pulses are 2+ on the right side, and 2+ on the left side  Pulmonary/Chest: No accessory muscle usage  She is intubated  No respiratory distress  She has rhonchi  Abdominal: Soft  Bowel sounds are decreased     NAOMI tenderness secondary to encephalopathy   Genitourinary: Genitourinary Comments: Johnson to gravity   Neurological: She is unresponsive  GCS eye subscore is 1  GCS verbal subscore is 1  GCS motor subscore is 1  Pupils non reactive, no cough, no gag, no corneal reflex  Pt maintains respiratory drive with spontaneous resp effort   Skin: Skin is warm, dry and intact  No cyanosis  Nails show no clubbing      ______________________________________________________________________  Intake and Outputs:  I/O       12/31 0701 - 01/01 0700 01/01 0701 - 01/02 0700 01/02 0701 - 01/03 0700    P  O    0    I V  (mL/kg) 3256 3 (36 6) 3614 2 (39 5)     NG/GT 30      IV Piggyback  300     Total Intake(mL/kg) 3286 3 (37) 3914 2 (42 7) 0 (0)    Urine (mL/kg/hr) 1260 (0 6) 2645 (1 2)     Emesis/NG output 250 (0 1) 120 (0 1)     Stool  0 (0)     Total Output 1510 2765      Net +1776 3 +1149 2 0           Unmeasured Stool Occurrence  0 x         UOP: /hour   Nutrition:        Diet Orders            Start     Ordered    12/29/17 1915  Diet NPO  Diet effective now     Question:  Diet Type  Answer:  NPO    12/29/17 1922          Labs:     Results from last 7 days  Lab Units 01/02/18  0856 01/01/18  0431 12/31/17  0431 12/30/17  0511  12/29/17  2311 12/29/17  1947   WBC Thousand/uL 14 61* 13 12* 19 82* 8 36  < > 6 86 13 62*   HEMOGLOBIN g/dL 9 8* 8 9* 11 1* 12 1  < > 12 7 13 0   HEMATOCRIT % 29 3* 26 1* 32 6* 35 9  < > 37 2 39 1   PLATELETS Thousands/uL 151 133* 149 145*  < > 166 177   NEUTROS PCT %  --   --   --  88*  --  84* 80*   MONOS PCT %  --   --   --  5  --  3* 1*   MONO PCT MAN %  --   --  2*  --   --   --   --    < > = values in this interval not displayed       Results from last 7 days  Lab Units 01/02/18  0856 01/01/18  2233 01/01/18  0431 12/31/17  0431   SODIUM mmol/L 140 138 141 141   POTASSIUM mmol/L 3 4* 3 4* 3 3* 4 0   CHLORIDE mmol/L 107 105 107 106   CO2 mmol/L 25 24 26 27   BUN mg/dL 21 25 27* 31*   CREATININE mg/dL 0 79 0 79 0 88 1 14   CALCIUM mg/dL 8 3 7 9* 7 7* 8 0* ALBUMIN g/dL 2 1*  --  2 0* 2 4*   TOTAL PROTEIN g/dL 5 9*  --  5 1* 5 6*   BILIRUBIN TOTAL mg/dL 1 43*  --  0 81 0 66   ALK PHOS U/L 110  --  77 71   ALT U/L 106*  --  141* 236*   AST U/L 138*  --  203* 329*   GLUCOSE RANDOM mg/dL 106 115 120 148*       Results from last 7 days  Lab Units 01/01/18  0431 12/31/17  0431 12/30/17  1629   MAGNESIUM mg/dL 2 0 2 3 2 4       Results from last 7 days  Lab Units 01/01/18  0431 12/31/17  0431 12/30/17  1017   PHOSPHORUS mg/dL 2 4 3 5 3 0      Urine osmo 619  Serum osmo 292    Imaging: none today  EKG: SR on tele by my read  Micro:  Procedure Component Value - Date/Time   Blood culture [[id number]] Collected: 12/29/17 2316   Lab Status: Preliminary result Specimen: Blood from Arm, Left Updated: 01/02/18 0801    Blood Culture No Growth at 72 hrs  Influenza A/B and RSV by PCR [[id number]] (Normal) Collected: 12/29/17 2119   Lab Status: Final result Specimen: Nasopharyngeal from Nasopharyngeal Swab Updated: 12/30/17 1338    INFLU A PCR None Detected    INFLU B PCR None Detected    RSV PCR None Detected   Blood culture [[id number]] Collected: 12/29/17 1947   Lab Status: Preliminary result Specimen: Blood from Arm, Right Updated: 01/01/18 2301    Blood Culture No Growth at 72 hrs       Allergies: No Known Allergies  Medications:   Scheduled Meds:    aspirin 325 mg Per OG Tube Daily   chlorhexidine 15 mL Swish & Spit Q12H [address]   famotidine 20 mg Intravenous BID AC   heparin (porcine) 5,000 Units Subcutaneous Q8H [address]   insulin lispro 1-6 Units Subcutaneous Q6H [address]   metoprolol 5 mg Intravenous Q6H   pantoprazole 40 mg Intravenous Early Morning   scopolamine 1 patch Transdermal Q72H     Continuous Infusions:    amiodarone 1 mg/min Last Rate: 1 mg/min (01/02/18 0031)   multi-electrolyte 75 mL/hr Last Rate: 100 mL/hr (01/02/18 0031)     PRN Meds:    hydrALAZINE 5 mg Q6H PRN     VTE Pharmacologic Prophylaxis:   Pharmacologic: Heparin  Mechanical VTE Prophylaxis in Place: Yes    Invasive lines and devices: Invasive Devices     Central Venous Catheter Line            CVC Central Lines 12/30/17 Triple 16cm 3 days          Drain            NG/OG/Enteral Tube Orogastric Center mouth 3 days    Urethral Catheter Temperature probe 16 Fr  3 days          Airway            ETT  8 mm 3 days                   Counseling / Coordination of Care  Total time spent today 32 minutes  Greater than 50% of total time was spent with the patient and / or family counseling and / or coordination of care  A description of the counseling / coordination of care: discussion of clinical exam findings    Code Status: Level 1 - Full Code    Portions of the record may have been created with voice recognition software  Occasional wrong word or "sound a like" substitutions may have occurred due to the inherent limitations of voice recognition software  Read the chart carefully and recognize, using context, where substitutions have occurred      DEVAN Amanda

## 2018-01-02 NOTE — SOCIAL WORK
Patient has family at bedside and [facility] is following  CM remains available to assist as needed

## 2018-01-03 NOTE — RESPIRATORY THERAPY NOTE
RT Ventilator Management Note  Tiara Kamara 78 y o  female MRN: [id number]  Unit/Bed#: [facility] 515-01 Encounter: [id number]      Daily Screen       1/2/2018 0751 1/3/2018 0734          Patient safety screen outcome[de-identified] Failed -      Not Ready for Weaning due to[de-identified] Underline problem not resolved Hemodynamically unstable              Physical Exam:   Assessment Type: Assess only  General Appearance: Unresponsive  Respiratory Pattern: Assisted  Chest Assessment: Chest expansion symmetrical  Bilateral Breath Sounds: Diminished, Clear  Cough: None  Suction: ET Tube  O2 Device: ventilator      Resp Comments: pt cont to beverly AC mode no response from pt at all no other changes at this time will cont to monitor

## 2018-01-03 NOTE — PROGRESS NOTES
Darian Dopp 78 y o  female MRN: [id number]  [address]   Unit/Bed#: [place] 101- Encounter: [id number]    Death Pronouncement Note    Called to bedside by RN  Patient is identified visually and identification confirmed with hospital identification bracelet  No spontaneous movements were present  There was no response to verbal or tactile stimuli  No spontaneous respirations present  No breath sounds were appreciated over bilateral lung fields  No heart sounds were auscultated across the precordium  Asystolic on two contiguous leads  Time of death: 13:06  Family was notified via bedside  Attending physician, Dr Renata Cordero, notified       Marychuy Cline PA-C  January 3, 2018

## 2018-01-03 NOTE — PROGRESS NOTES
CCM at bedside  Family present as well as their [facility]   Last rites given and patient extubated to room air by resp  Therapy at 1250  Patient given Morphine and Ativan as ordered for comfort

## 2018-01-03 NOTE — PROGRESS NOTES
Patients  and children at bedside  Update given to them on events of night time   also at bedside to provide support  Plan by family to withdraw care as patient has not progressed to brain death  CCM aware of family present and awaiting their arrival  GOL coordinator given update

## 2018-01-03 NOTE — DISCHARGE SUMMARY
Discharge Summary - Vangie Setting 78 y o  female MRN: [id number]    Unit/Bed#: [facility] 629-53 Encounter: [id number]    Admission Date: 12/29/2017     Admitting Diagnosis: Cardiac arrest [facility]) [I46 9]    HPI: Vangie Childs is a 78 y o  female who presents to the hospital following sudden unresponsive episode at home with CPR initiated by her   Her  reports she was in her normal state of health and without any present symptoms of illness, cold, flu or any complaints  They were getting ready to go out to a party when the  her her groaning, came into the kitchen and found her sitting in the chair groaning and unresponsive  He eased her to the ground and started CPR and called EMS  She had ROSC enroute to the hospital but had VTach which was initially with a pulse for which amio bolus was given but then proceed into PEA and CPR was initiated in the ED  She was intubated in the field with a 6 0 tube which was exchanged for an 8 0 after blood was noted coming out of the ET tube and a bronch was completed in the ED which revealed some blood but no sputum or mucus plugging  She had ROSC in the ER, was placed on levo at 10, amio gtt started and vent settings of AC 22 450 100% 12 PEEP  Her CXR shows diffuse patchy densities in the left lung and right upper lobes  She is brought to the ICU and will be worked up for possible MI, PNA, or flu as the leading suspected diagnosis       Past medical history is obtained from the  whom reports no medical problems, a history only of breast cancer 20 years ago which was treated with chemo and radiation and without any further sequela since original treatment course  He denies any present medication use for her or any medication allergies  He reports no sick contacts and no symptoms suggestive of infectious illness such as flu or pneumonia, no complaints of SOB or CP       Procedures Performed:   Orders Placed This Encounter   Procedures    Bronchoscopy    Bronchoscopy    CENTRAL LINE   [facility] Course: 30Dec - Patient admitted to the ICU s/p cardiac arrest at home  Tube exchange completed in ER w/ subsequent bronchoscopy for bloody secretions from ETT  Levophed as high as 10 overnight, weaned to off this morning  Patient was started on continuous EEG monitoring overnight w/ no reported seizure activity from epileptologist  Patient was initiated on targeted temperature management around 5am  Prior to initiation, patient's temperature was 36 degrees w/o intervention  Overnight, given 500cc bolus of crystalloid for low urine output  Also given 2 amps bicarb overnight  Started on heparin gtt this morning  Patient was initially treated as full code during her stay in the ICU but as the prognosis became poorer, eventually losing gag reflexes, and there was no change in the patient's neuro status over several days, the family met on and decided to withdraw care  Care was withdrawn on 2018 and the patient  shortly thereafter  Significant Findings, Care, Treatment and Services Provided: Bronchoscopy and continuous video EEG monitoring unremarkabe  Complications: N/a    Discharge Diagnosis:     Condition at Discharge:      Discharge instructions/Information to patient and family:   See after visit summary for information provided to patient and family  Provisions for Follow-Up Care:  See after visit summary for information related to follow-up care and any pertinent home health orders  Disposition:     Planned Readmission: No    Discharge Statement   I spent 30 minutes discharging the patient  This time was spent on the day of discharge  I had direct contact with the patient on the day of discharge  Additional documentation is required if more than 30 minutes were spent on discharge  Discharge Medications:  See after visit summary for reconciled discharge medications provided to patient and family

## 2018-01-03 NOTE — PROGRESS NOTES
Ame Becerra at [facility] office notifed of death  No follow up required  Zygmunt Later at [address] [facility] 84 notified of death, no further follow up required

## 2018-01-03 NOTE — PROGRESS NOTES
01/03/18 0830   Plan of Care   Comments Visited with , dtr, son, son-in law  Encouraged focus on present, provided grief counseling, explored emotional needs & rescources, facilitated life review, facilitated story telling, cultivated a relationship of care and support, listened empathetically, catharsis, debriefed, distress reduced, expressed gratitude, expressed humor, identified meaningful connections, progressed toward acceptance, progressed toward focus on present, reported decreased pain

## 2018-01-03 NOTE — PROGRESS NOTES
Time of death pronounced by Faye PEREZ at [phone]  Family at bedside at time of death  No belongings present for them to take  [facility] per patients

## 2018-01-03 NOTE — RESPIRATORY THERAPY NOTE
RT Ventilator Management Note  Narciso Agosto 78 y o  female MRN: [id number]  Unit/Bed#: [facility] 538-07 Encounter: [id number]      Daily Screen       1/1/2018 1613 1/2/2018 0751          Patient safety screen outcome[de-identified] Passed Failed      Not Ready for Weaning due to[de-identified] Underline problem not resolved Underline problem not resolved              Physical Exam:   Assessment Type: (P) Assess only  General Appearance: (P) Sedated  Respiratory Pattern: (P) Assisted  Chest Assessment: (P) Chest expansion symmetrical  Bilateral Breath Sounds: (P) Clear, Diminished      Resp Comments: (P) no vent changes, pt appears comfortable on current settings will continue to monitor

## 2018-01-03 NOTE — PROGRESS NOTES
Progress Note - Critical Care   Mery Roach 78 y o  female MRN: [id number]  Unit/Bed#: [facility] 515-01 Encounter: [id number]    Chief Complaint: Intubated  Unresponsive  HPI/24hr events: Drawing larger Vt overnight and placed on pressure support for some time  Now back on AC     98 2-102 2 (Tc = 101 1F)    5-27  130-173/55-80  92-98%    Physical Exam: Physical Exam   Constitutional: She appears well-developed and well-nourished  No distress  HENT:   Head: Normocephalic and atraumatic    ++ Tongue edematous   Eyes: Scleral icterus is present  R pupil 4mm unreactive, L pupil 5mm unreactive  Neck: No JVD present  No tracheal deviation present  R IJ in place  Site appears CDI   Cardiovascular: Normal rate and regular rhythm  Exam reveals no gallop and no friction rub  No murmur heard  Distant heart sounds  Unable to palpate RUE radial pulse or bilateral Dps  L radial is 2+  Pulmonary/Chest: Breath sounds normal  No stridor  She has no wheezes  She has no rales  Currently intubated on AC at 12/400/40/10   Abdominal: Soft  Bowel sounds are normal  She exhibits no distension  There is no guarding  Genitourinary:   Genitourinary Comments: Johnson in place draining clear/yellow urine  Musculoskeletal: Normal range of motion  She exhibits no edema or deformity  Neurological: She is unresponsive  GCS eye subscore is 1  GCS verbal subscore is 1  GCS motor subscore is 1  Skin: Skin is warm and dry  Capillary refill takes less than 2 seconds  No rash noted  She is not diaphoretic  No erythema  Assessment: 77 yo female s/p cardiac arrest with plan to withdraw care today at 0800 and possible gift of life  Plan:          Neuro: Serial neuro exams  GOL following  CV: Continue to monitor hemodynamics  Continue amio infusion at 1mg/min  Continue ASA and beta blocker                 Lung: Doing well on current AC settings   Patient on pressure support for approximately 4 hours last night  Continue to monitor  GI: Continue H-2 blocker for GI ppx  FEN: Maintain maintenance IVF isolyte at 75 ml/hr, repleted K with 40meq IV x 1 this morning, NPO                 : Maintain wagner for comfort                 Id: Patient febrile since approximately 1500 yesterday but currently trending down  WBC trending down  No indication for Abx at this time  Continue to monitor fever curve  Heme: transfuse for Hgb < 7  SQH for chemoprophylaxis                 Endo: SSI                            Msk/Skin: routine ICU skin care                 Disposition: Scheduled to withdraw care today around 0800 with possible GOL  Vitals:    18 0400 18 0430 18 0538 18 0542   BP: 145/66 141/68     Pulse: 86 88     Resp: 21 22     Temp: (!) 101 5 °F (38 6 °C) (!) 101 1 °F (38 4 °C)     TempSrc:       SpO2: 95% 96% 96%    Weight:    89 9 kg (198 lb 3 1 oz)   Height:         Arterial Line BP: 170/80  Arterial Line MAP (mmHg): 112 mmHg    Temperature:   Temp (24hrs), Av 8 °F (38 2 °C), Min:98 2 °F (36 8 °C), Max:102 2 °F (39 °C)    Current: Temperature: (!) 101 1 °F (38 4 °C)    Weights:   IBW: 61 6 kg    Body mass index is 31 04 kg/m²    Weight (last 2 days)     Date/Time   Weight    18 0542  89 9 (198 19)    18 0600  91 6 (201 94)    18 0600  88 9 (195 99)              Hemodynamic Monitoring:  N/A     Non-Invasive/Invasive Ventilation Settings:  Respiratory    Lab Data (Last 4 hours)    None         O2/Vent Data (Last 4 hours)       0345  0538         Vent Mode CPAP/PS Spont AC/VC      Resp Rate (BPM) (BPM)  12      Vt (mL) (mL)  400      FIO2 (%) (%)  40      PEEP (cmH2O) (cmH2O)  10      FIO2 (%) (%) 40       PEEP (cmH2O) (cmH2O) 10       Pressure Support (cmH2O) (cmH20) 10       MV (Obs) 11 9       RSBI 49                 No results found for: PHART, TRV4ATG, PO2ART, XWH8GKY, U7OMDIXB, BEART, SOURCE  SpO2: SpO2: 97 %    Intake and Outputs:  I/O       01/01 0701 - 01/02 0700 01/02 0701 - 01/03 0700 01/03 0701 - 01/04 0700    P  O   0     I V  (mL/kg) 3614 2 (39 5) 2482 1 (27 6)     NG/GT  145     IV Piggyback 300 100     Total Intake(mL/kg) 3914 2 (42 7) 2727 1 (30 3)     Urine (mL/kg/hr) 2645 (1 2) 4340 (2)     Emesis/NG output 120 (0 1) 50 (0)     Stool 0 (0)      Total Output 2765 4390      Net +1149 2 -1663             Unmeasured Stool Occurrence 0 x          UOP: 2 24 ml/kg/hour   Nutrition:        Diet Orders            Start     Ordered    12/29/17 1915  Diet NPO  Diet effective now     Question:  Diet Type  Answer:  NPO    12/29/17 1922          Labs:     Results from last 7 days  Lab Units 01/03/18  0515 01/02/18  0856 01/01/18  0431 12/31/17  0431 12/30/17  0511  12/29/17  2311 12/29/17  1947   WBC Thousand/uL 12 82* 14 61* 13 12* 19 82* 8 36  < > 6 86 13 62*   HEMOGLOBIN g/dL 10 2* 9 8* 8 9* 11 1* 12 1  < > 12 7 13 0   HEMATOCRIT % 31 0* 29 3* 26 1* 32 6* 35 9  < > 37 2 39 1   PLATELETS Thousands/uL 146* 151 133* 149 145*  < > 166 177   NEUTROS PCT %  --   --   --   --  88*  --  84* 80*   MONOS PCT %  --   --   --   --  5  --  3* 1*   MONO PCT MAN %  --   --   --  2*  --   --   --   --    < > = values in this interval not displayed     Results from last 7 days  Lab Units 01/03/18  0515 01/02/18  0856 01/01/18 2233 01/01/18  0431   SODIUM mmol/L 146* 140 138 141   POTASSIUM mmol/L 3 4* 3 4* 3 4* 3 3*   CHLORIDE mmol/L 113* 107 105 107   CO2 mmol/L 25 25 24 26   BUN mg/dL 19 21 25 27*   CREATININE mg/dL 0 86 0 79 0 79 0 88   CALCIUM mg/dL 8 2* 8 3 7 9* 7 7*   TOTAL PROTEIN g/dL 5 8* 5 9*  --  5 1*   BILIRUBIN TOTAL mg/dL 2 37* 1 43*  --  0 81   ALK PHOS U/L 166* 110  --  77   ALT U/L 80* 106*  --  141*   AST U/L 108* 138*  --  203*   GLUCOSE RANDOM mg/dL 109 106 115 120       Results from last 7 days  Lab Units 01/01/18  0431 12/31/17  0431 12/30/17  1629   MAGNESIUM mg/dL 2 0 2 3 2 4       Results from last 7 days  Lab Units 01/01/18  0431 12/31/17  0431 12/30/17  1017   PHOSPHORUS mg/dL 2 4 3 5 3 0        Results from last 7 days  Lab Units 01/01/18  0431 12/31/17  2045 12/31/17  1240  12/30/17  0338 12/29/17  1742   INR   --   --   --   --  1 30* 1 33*   PTT seconds 63* 63* 49*  < > 28 41*   < > = values in this interval not displayed  Results from last 7 days  Lab Units 01/01/18  1000   LACTIC ACID mmol/L 1 7       0  Lab Value Date/Time   TROPONINI 26 80 (H) 12/30/2017 0821   TROPONINI 34 00 (H) 12/30/2017 0222   TROPONINI 27 70 (H) 12/29/2017 2311   TROPONINI 9 15 (H) 12/29/2017 1947       Imaging: N/A  EKG: N/A    Micro:  Lab Results   Component Value Date    BLOODCX No Growth at 72 hrs  12/29/2017    BLOODCX No Growth After 4 Days  12/29/2017       Allergies: No Known Allergies    Medications:   Scheduled Meds:  aspirin 325 mg Per OG Tube Daily   chlorhexidine 15 mL Swish & Spit Q12H [address]   famotidine 20 mg Per G Tube BID   heparin (porcine) 5,000 Units Subcutaneous Q8H [address]   insulin lispro 1-6 Units Subcutaneous Q6H [address]   metoprolol 5 mg Intravenous Q6H   scopolamine 1 patch Transdermal Q72H     Continuous Infusions:  amiodarone 1 mg/min Last Rate: 1 mg/min (01/02/18 1635)   multi-electrolyte 75 mL/hr Last Rate: 75 mL/hr (01/03/18 0055)     PRN Meds:    acetaminophen 650 mg Q6H PRN   glycopyrrolate 0 2 mg Q6H PRN   hydrALAZINE 5 mg Q6H PRN       VTE Pharmacologic Prophylaxis: Heparin  VTE Mechanical Prophylaxis: sequential compression device    Invasive lines and devices: Invasive Devices     Central Venous Catheter Line            CVC Central Lines 12/30/17 Triple 16cm 4 days          Drain            NG/OG/Enteral Tube Orogastric Center mouth 4 days    Urethral Catheter Temperature probe 16 Fr  4 days          Airway            ETT  8 mm 4 days                   Counseling / Coordination of Care  Total Critical Care time spent 20 minutes excluding procedures, teaching and family updates         Code Status: Level 1 - Full Code     Portions of the record may have been created with voice recognition software  Occasional wrong word or "sound a like" substitutions may have occurred due to the inherent limitations of voice recognition software  Read the chart carefully and recognize, using context, where substitutions have occurred       Abdifatah Schneider MD

## 2018-01-03 NOTE — RESPIRATORY THERAPY NOTE
RT Ventilator Management Note  Nessa Green 78 y o  female MRN: [id number]  Unit/Bed#: [facility] 629-54 Encounter: [id number]      Daily Screen       1/1/2018 1613 1/2/2018 0751          Patient safety screen outcome[de-identified] Passed Failed      Not Ready for Weaning due to[de-identified] Underline problem not resolved Underline problem not resolved              Physical Exam:   Assessment Type: Assess only  General Appearance: Unresponsive  Respiratory Pattern: Assisted  Chest Assessment: Chest expansion symmetrical  Bilateral Breath Sounds: Diminished, Clear  Cough: None  Suction: ET Tube  O2 Device: ventilator      Resp Comments: Pt placed on PSV settings do to severe disynchony with AC/VC settings  Pt no longer tachypnic(resp rate 35-40), or breath stacking  Pt now is breathing at a rate of 20-25, normal resp pattern and appears to be resting comfortably  Judson Jimmie aware and agrees with recommendation of PSV  Will continue to monitor and assess per resp protocol

## 2018-01-03 NOTE — RESPIRATORY THERAPY NOTE
RT Ventilator Management Note  Nicolás Rivera 78 y o  female MRN: [id number]  Unit/Bed#: [facility] 814-22 Encounter: [id number]      Daily Screen       1/2/2018 0751 1/3/2018 0734          Patient safety screen outcome[de-identified] Failed -      Not Ready for Weaning due to[de-identified] Underline problem not resolved Hemodynamically unstable              Physical Exam:   Assessment Type: (P) Assess only  General Appearance: (P) Unresponsive  Respiratory Pattern: (P) Assisted  Chest Assessment: (P) Chest expansion symmetrical  Bilateral Breath Sounds: (P) Diminished, Clear  Cough: None  Suction: ET Tube  O2 Device: ventilator      Resp Comments: (P) pt cont to [place] AC mode no response from pt at all no other changes at this time will cont to monitor

## 2018-01-04 LAB — BACTERIA BLD CULT: NORMAL

## 2018-01-04 NOTE — CASE MANAGEMENT
Continued Stay Review    Date: 1/2/18    inpatient    Vital Signs:   01/02/18 0518  99 3 °F (37 4 °C)  80   29  164/89  109  --  --  97 %     01/02/18 2000   101 8 °F (38 8 °C)   106  20  134/62  82  --  --  94 %       Abnormal Labs/Diagnostic Results:   1/1 PCXR: Lines and tubes in satisfactory position no pneumothorax  Bilateral pleural effusions  Improving bilateral regions of alveolar opacity suggesting improving edema or pneumonia  1/2: ph 7 536   pco2 25 5   hco3 21 1   Art o2 13 4   k 3 4   ast 138   Alt 106   t prot 5 9   Alb 2 1   t bili 1 43  Wbc 14 61   hgb 9 8   hct 29 3  EKG: Normal sinus rhythm  Low voltage QRS  Septal infarct (cited on or before 30-DEC-2017)  Prolonged QT    Age/Sex: 78 y o  female     Assessment/Plan:   PER CRIT CARE 1/2/18:  Impression:  Principal Problem:    Cardiac arrest  Active Problems:    Acute encephalopathy    Acute respiratory failure    Anoxic brain injury (HCC)    V-tach    Leukocytosis    Transaminitis    Hypomagnesemia    Hypokalemia    Respiratory alkalosis    Obesity (BMI 30 0-34  9)  Resolved Problems:    Hemoptysis    Lactic acidosis    Metabolic acidosis     Cardiac arrest initial shockable rhythm and asystole  Acute hypoxic respiratory failure  NSVT  NTEMI  Encephalopathy concern for anoxic brain injury status post cardiac arrest  Bilateral pulmonary opacities pneumonia versus aspiration versus pulmonary contusions  Transaminitis-likely shock liver  Hyperglycemia  Coagulopathy  Hemoptysis-unclear etiology resolved     Plan:     Patient with unequal and dilated pupils today  She continues to have spontaneous respirations on the ventilator  Hemodynamically patient has remained appropriate  Continue with supportive measures  Avoid all sedation medications at this time  Patient's family wishes to continue with supportive care with possible transition to organ donation status  Monitor electrolytes and renal function  Replete as necessary electrolytes    Plan for reassessment later this afternoon        PER  NOTES 18: Pt  organ donor  Pt  is not brain dead  they will wait until tomorrow to see if she can donate   If not they will withdraw her from the vent      Discharge Plan: expected to

## 2018-01-08 ENCOUNTER — GENERIC CONVERSION - ENCOUNTER (OUTPATIENT)
Dept: OTHER | Facility: OTHER | Age: 68
End: 2018-01-08

## 2018-01-14 VITALS
SYSTOLIC BLOOD PRESSURE: 125 MMHG | BODY MASS INDEX: 31.12 KG/M2 | HEIGHT: 64 IN | WEIGHT: 182.25 LBS | DIASTOLIC BLOOD PRESSURE: 80 MMHG

## 2018-01-23 NOTE — PROCEDURES
Procedures by Allen Gonzalez DO at 12/29/2017  7:57 PM      Author:  Allen Gonzalez DO Service:  Critical Care/ICU Author Type:  Physician    Filed:  12/29/2017  8:01 PM Date of Service:  12/29/2017  7:57 PM Status:  Signed    :  Allen Gonzalez DO (Physician)        Procedure Orders:       1  Bronchoscopy [[id number]] ordered by Allen Gonzalez DO at 12/29/17 1957                 Post-procedure Diagnoses:       1  Cardiac arrest [facility]) [I46 9]                 Bronchoscopy  Date/Time: 12/29/2017 5:57 PM  Performed by: Hali Love  Authorized by: Hali Love     Patient location:  Bedside and ED  Consent:     Consent obtained:  Emergent situation  Universal protocol:     Patient identity confirmed:  Arm band  Indications:     Procedure Purpose: diagnostic      Indications: pneumonia/infiltrate and hemoptysis    Anesthesia (see MAR for exact dosages): Anesthesia method:  None  Airway:     Airway:  Endotracheal tube positioned above hillary  No obvious blood clots nor mucus plugging appreciated  Source of hemoptysis not identified    Final Diagnosis/Findings:      Bilateral pulmonary opacities  Hemoptysis  Procedure does not include criticalCare time                     Received for:Provider  EPIC   Dec 29 2017  8:01PM [facility] Standard Time

## 2018-01-23 NOTE — PROCEDURES
Procedures by Michelle Enriquez MD  at 2017 11:16 AM      Author:  Michelle Enriquez MD Service:  Neurology Author Type:  Physician    Filed:  2017 11:39 AM Date of Service:  2017 11:16 AM Status:  Signed    :  Michelle Enriquez MD (Physician)        Procedure Orders:       1  EEG Video Monitoring 24 Hour [id number] ordered by Michelle Enriquez MD at 17 2350                  Continuous Video EEG Monitoring       Patient Name:  Kieran Wynn  MRN: [id number]   :  1950 File #: [id number]   Age: 78 y o  Encounter #: [id number]   Date Performed: 2017 - 2017  Date EEG Reviewed: 2017    Report date: 2017          Study type: Continuous video EEG    ICD 10 diagnosis: anoxic encephalopathy    Start time: 0800 on  End time: 0800 on      -------------------------------------------------------------------------------------------------------------------   Patient History:  78year old female presents after cardiac arrest  Per  pt was sitting in kitchen when he heard her groaning  When he went into the kitchen he found her slumped in chair,  began cpr  and called EMS  Patient had ROSC enroute to hospital but went into [facility]  Patient reportedly  initially had a pulse and was given an amiodarone bolus but thn proceeded to PEA  While in ICU patientt was noted to have facial twitching  This recording was performed to determine whether episodes  of facial twitching are seizures and to determine if patient is having subtle/subclinical electrographic  seizures      Medications include:     aspirin 325 mg Per OG Tube Daily   busPIRone 30 mg Oral Q8H   chlorhexidine 15 mL Swish & Spit Q12H [address]   famotidine 20 mg Intravenous BID AC   insulin lispro 1-6 Units Subcutaneous Q6H [address]   metoprolol 5 mg Intravenous Q6H   pantoprazole 40 mg Intravenous Early Morning     The patient is normothermic and is not receiving intravenous sedation   -------------------------------------------------------------------------------------------------------------------   Description of Procedure:  ·  32 channel digital recording with electrodes placed according to the International 10-20 system with additional  T1/T2 electrodes, EOG, EKG, and simultaneous  video  A monitoring technologist supervised the continuous recording  The recording was technically satisfactory  -------------------------------------------------------------------------------------------------------------------   Results:   Background Activity:   Throughout the recording the background activity is continuously diffusely suppressed  Activation Procedures:   Neither hyperventilation nor photic stimulation was performed  Abnormal Findings:  See Background Activity  Initially, frequent 1 - 2 second bursts of high amplitude poorly organized diffuse intermixed theta and delta slowing are noted  Starting at 9749 0982, these bursts of EEG activity gradually but progressively  diminish in amplitude, such that by the end of the recording, the EEG is essentially flat when viewed at the standard sensitivity of 7 microvolts per mm  No focal abnormalities nor interictal epileptiform discharges were noted  No electrographic seizures occurred during the recording  Other findings: The single lead EKG demonstrated a regular  rhythm  Events:   No push button events occurred during this study  No periods of facial twitching were noted  -------------------------------------------------------------------------------------------------------------------   Interpretation:   Markedly abnormal  24 hour continuous video-EEG recording, due to  nearly continuous diffuse suppression of the background activity   Initially, frequent bursts of diffuse high amplitude intermixed theta and delta activity are noted, but after 1710, the electrographic activity gradually progressively diminishes in amplitude,  such that by the end of the recording, the EEG is essentially flat when viewed at the standard sensitivity of 7 microvolts per mm  This is consistent with severe diffuse cerebral dysfunction, the dysfunction becoming progressively more severe over the  course of the recording  No focal abnormalities nor interictal epileptiform discharges were noted  No electrographic seizures occurred during the recording  Leah Camp MD   [address] [facility]  Pager # [phone]    Dec 31 2017 11:40AM [facility] Standard Time

## 2018-01-23 NOTE — PROCEDURES
Procedures by DEVAN Kay at 12/30/2017  5:05 AM      Author:  DEVAN Kay Service:  Critical Care/ICU Author Type:  Nurse Practitioner    Filed:  12/30/2017  5:43 AM Date of Service:  12/30/2017  5:05 AM Status:  Attested    :  DEVAN Kay (Nurse Practitioner)  Cosigner:  Sharan Huertas MD at 12/30/2017  6:08 AM      Procedure Orders:       1  CENTRAL LINE [[id number]] ordered by DEVAN Kay at 12/30/17 0505                 Post-procedure Diagnoses:       1  Cardiac arrest [facility]) [I46 9]              Attestation signed by Sharan Huertas MD at 12/30/2017  6:08 AM      I was present for and participated in all aspects of this procedure while in sterile gown and gloves  This note is an accurate reflection of the procedure  Patient tolerated  procedure well  Chest x-ray reviewed  Line in good position  Line okay to use                      Central Line Insertion  Date/Time: 12/30/2017 5:05 AM  Performed by: Kera Torres  Authorized by: Kera Torres     Patient location:  Bedside  Other Assisting Provider:  Yes (comment) (Dr Leigh Salazar)    Consent:     Consent obtained:  Written    Consent given by:  Spouse    Risks discussed:  Arterial puncture, bleeding, incorrect placement, infection, nerve damage and pneumothorax    Alternatives discussed:  No treatment and alternative treatment  Universal protocol:     Procedure explained and questions answered to patient or proxy's satisfaction: yes      Relevant documents present and verified: yes      Test results available and properly labeled: yes       Imaging studies available: yes      Required blood products, implants, devices, and special equipment available: yes      Site/side marked: no      Immediately prior to procedure, a time out was called: yes      Patient identity confirmed:  Arm band and hospital-assigned identification number  Pre-procedure details:     Skin preparation:  ChloraPrep  Indications: Central line indications: medications requiring central line    Anesthesia (see MAR for exact dosages): Anesthesia method:  Local infiltration    Local anesthetic:  Lidocaine 1% w/o epi  Procedure details:     Location:  Right internal jugular    Vessel type: vein      Laterality:  Right    Approach: percutaneous technique used      Patient position:  Reverse Trendelenburg    Catheter type:  Triple lumen 16cm    Catheter size:  7 5 Fr    Landmarks identified: yes      Ultrasound guidance: yes      Sterile ultrasound techniques: Sterile gel and sterile probe covers were used      Number of attempts:  1    Successful placement: yes    Post-procedure details:     Post-procedure:  Dressing applied and line sutured    Assessment:  Blood return through all ports, free fluid flow, placement verified by x-ray and no pneumothorax on x-ray    Post-procedure complications: none      Patient tolerance of procedure:   Tolerated well, no immediate complications                       Received for:Ariella HARTMAN  Dec 30 2017  6:08AM [place] Standard Time

## 2018-01-23 NOTE — PROCEDURES
Procedures by Mikhail Ceja MD at 12/31/2017  6:47 PM      Author:  Mikhail Ceja MD Service:  Critical Care/ICU Author Type:  Physician    Filed:  12/31/2017  6:49 PM Date of Service:  12/31/2017  6:47 PM Status:  Signed    :  Mikhail Ceja MD (Physician)        Procedure Orders:       1  Bronchoscopy [[id number]] ordered by Mikhail Ceja MD at 12/31/17 1847                 Post-procedure Diagnoses:       1  Hemoptysis [R04 2]                 Bronchoscopy  Date/Time: 12/31/2017 6:47 PM  Performed by: Magdi Franklin  Authorized by: Magdi Franklin     Consent:     Consent obtained:  Written    Consent given by:  Spouse    Risks discussed:  Pain, pneumothorax, infection, death, bleeding and adverse reaction to sedation    Alternatives discussed:  No treatment  Universal protocol:     Procedure explained and questions answered to patient or proxy's satisfaction: yes      Relevant documents present and verified: yes      Test results available and properly labeled: yes       Required blood products, implants, devices and special equipment available: yes      Immediately prior to procedure a time out was called: yes      Patient identity confirmed: Anonymous protocol, patient vented/unresponsive  Indications:     Procedure Purpose: diagnostic      Indications: pneumonia/infiltrate    Sedation:     Sedation type:  Continuous (ICU/vent)  Airway:     Airway: Thin blood secretions noted throughout airway  No signs of active bleeding noted  Thin blood aspirated from alveolar level  No concern for DAH  Post-procedure details:     Patient tolerance of procedure: Tolerated well, no immediate complications  Final Diagnosis/Findings:      Old blood noted in airway, no signs of active bleeding  Madonna GEIGER    Dec 31 2017  6:49PM [facility] Standard Time

## 2018-01-23 NOTE — PROCEDURES
Procedures by Eugenia Lau MD at  2018  2:21 PM      Author:  Eugenia Lau MD Service:  Neurology Author Type:  Physician    Filed:  2018  2:28 PM Date of Service:  2018  2:21 PM Status:  Addendum    :  Eugenia Lau MD (Physician)      Related Notes:  Original Note by Eugenia Lau MD (Physician) filed at 2018  2:26 PM        Procedure Orders:       1  EEG Video Monitoring 24 Hour [id number] ordered by Eugenia Lau MD at 17 1115                  Continuous Video EEG Monitoring       Patient Name:  Richard Reyes  MRN: [id number]   :  1950 File #: Elijah Bartlett    Age: 78 y o  Encounter #: [id number]   Date Performed: 2017  Date EEG Reviewed: 2017    Report date: 2018          Study type: Continuous video EEG    ICD 10 diagnosis: anoxic encephalopathy    Start time: 0800 on  End time: 1430 on      -------------------------------------------------------------------------------------------------------------------   Patient History:  78year old female presents after cardiac arrest  Per  pt was sitting in kitchen when he heard her groaning  When he went into the kitchen he found her slumped in chair,  began cpr  and called EMS  Patient had ROSC enroute to hospital but went into [facility]  Patient reportedly  initially had a pulse and was given an amiodarone bolus but thn proceeded to PEA  While in ICU patientt was noted to have facial twitching  This recording was performed to determine whether episodes  of facial twitching are seizures and to determine if patient is having subtle/subclinical electrographic  seizures      Medications include:     aspirin 325 mg Per OG Tube Daily   chlorhexidine 15 mL Swish & Spit Q12H [address]   famotidine 20 mg Intravenous BID AC   heparin (porcine) 5,000 Units Subcutaneous Q8H [address]   insulin lispro 1-6 Units Subcutaneous Q6H [address]   metoprolol 5 mg Intravenous Q6H   pantoprazole 40 mg Intravenous Early Morning   potassium chloride 40 mEq Intravenous Once   scopolamine 1 patch Transdermal Q72H     The patient is normothermic and is not receiving intravenous sedation   -------------------------------------------------------------------------------------------------------------------   Description of Procedure:  ·  32 channel digital recording with electrodes placed according to the International 10-20 system with additional  T1/T2 electrodes, EOG, EKG, and simultaneous  video  A monitoring technologist supervised the continuous recording  The recording was technically satisfactory  -------------------------------------------------------------------------------------------------------------------   Results:   Background Activity:   Throughout the recording the background activity is continuously diffusely suppressed  Activation Procedures:   Neither hyperventilation nor photic stimulation was performed  Abnormal Findings:  See Background Activity  No focal abnormalities nor interictal epileptiform discharges were noted  No electrographic seizures occurred during the recording  Other findings: The single lead EKG demonstrated a regular  rhythm  Events:   No push button events occurred during this study  No periods of facial twitching were noted  -------------------------------------------------------------------------------------------------------------------   Interpretation:   Markedly abnormal  7 hour continuous video-EEG recording, continuous diffuse suppression of the  background activity consistent with severe diffuse cerebral dysfunction  When viewed at the standard sensitivity of 7 microvolts per mm, no focal abnormalities nor interictal epileptiform discharges were noted  No electrographic seizures occurred during the recording       Leah Camp, [address] [facility]  Pager # [phone]           Received for:Amber Crowley M D   Jan 1 2018  2:28PM [facility] Standard Time

## 2018-01-23 NOTE — PROCEDURES
Procedures by Partic Valencia MD  at 2017  5:01 PM      Author:  Patric Valencia MD Service:  Neurology Author Type:  Physician    Filed:  2017  5:38 PM Date of Service:  2017  5:01 PM Status:  Signed    :  Patric Valencia MD (Physician)        Procedure Orders:       1  EEG Video Monitoring 24 Hour [id number] ordered by DEVAN Hancock at 17                  Continuous Video EEG Monitoring       Patient Name:  Eleni Lundborg  MRN: [id number]   :  1950 File #: [id number]   Age: 78 y o  Encounter #: [id number]   Date Performed: 2017  Date EEG Reviewed: 2017    Report date: 2017          Study type: Continuous video EEG    ICD 10 diagnosis: anoxic encephalopathy    Start time: 14 on  End time: 0800 on      -------------------------------------------------------------------------------------------------------------------   Patient History:  78year old female presents after cardiac arrest  Per  pt was sitting in kitchen when he heard her groaning  When he went into the kitchen he found her slumped in chair,  began cpr  and called EMS  Patient had ROSC enroute to hospital but went into [facility]  Patient reportedly  initially had a pulse and was given an amiodarone bolus but thn proceeded to PEA  While in ICU patientt was noted to have facial twitching  This recording was performed to determine whether episodes  of facial twitching are seizures and to determine if patient is having subtle/subclinical electrographic  seizures      Medications include:   busPIRone 30 mg Oral Q8H   chlorhexidine 15 mL Swish & Spit Q12H [address]   famotidine 20 mg Oral BID AC   Or      famotidine 20 mg Intravenous BID AC   insulin lispro 1-6 Units Subcutaneous Q6H [address]   lidocaine (PF)      pantoprazole 40 mg Intravenous Early Morning     The patient is not receiving intravenous sedation   -------------------------------------------------------------------------------------------------------------------   Description of Procedure:  ·  32 channel digital recording with electrodes placed according to the International 10-20 system with additional  T1/T2 electrodes, EOG, EKG, and simultaneous  video  A monitoring technologist supervised the continuous recording  The recording was technically satisfactory  -------------------------------------------------------------------------------------------------------------------   Results:   Background Activity:   Throughout the recording the background activity is continuously diffusely suppressed except for rare one second bursts of high amplitude poorly organized diffuse intermixed theta and delta slowing, which do  occur increasingly frequently as the study progresses  Activation Procedures:   Neither hyperventilation nor photic stimulation was performed  Abnormal Findings:  See Background Activity  No focal abnormalities nor interictal epileptiform discharges were noted  No electrographic seizures occurred during this recording  Other findings: The single lead EKG demonstrated a regular  rhythm  Events:   No push button events occurred during this study  No periods of facial twitching were noted  -------------------------------------------------------------------------------------------------------------------   Interpretation:   Markedly abnormal 8  hour continuous video-EEG recording, due to nearly  continuous diffuse suppression of the background activity, rare one second bursts of high amplitude intermixed theta and delta activity, which do appear to occur more frequently as the study progresses  This is thus essentially a burst-suppression pattern  with the proportion of periods of suppression being far greater than the periods where some EEG activity appears       This pattern indicates severe diffuse cerebral dysfunction which may be due to the patient's temperature ranging from 96 to 97 degrees Fahrenheit  EEG will be more indicative of the patient's inherent cerebral function once she is normothermic  The EEG  does indicate however the patient is not having electrographic seizures  Eran Bocanegra MD   [address] [facility]  Pager # Vernette Males M D    Dec 30 2017  5:39PM [facility] Standard Time